# Patient Record
Sex: MALE | Race: OTHER | URBAN - METROPOLITAN AREA
[De-identification: names, ages, dates, MRNs, and addresses within clinical notes are randomized per-mention and may not be internally consistent; named-entity substitution may affect disease eponyms.]

---

## 2020-07-12 ENCOUNTER — INPATIENT (INPATIENT)
Facility: HOSPITAL | Age: 65
LOS: 3 days | Discharge: HOME | DRG: 454 | End: 2020-07-16
Attending: ORTHOPAEDIC SURGERY | Admitting: ORTHOPAEDIC SURGERY
Payer: COMMERCIAL

## 2020-07-12 VITALS
DIASTOLIC BLOOD PRESSURE: 93 MMHG | OXYGEN SATURATION: 97 % | RESPIRATION RATE: 18 BRPM | HEIGHT: 68 IN | WEIGHT: 225.09 LBS | HEART RATE: 102 BPM | SYSTOLIC BLOOD PRESSURE: 157 MMHG | TEMPERATURE: 99 F

## 2020-07-12 DIAGNOSIS — E11.9 TYPE 2 DIABETES MELLITUS WITHOUT COMPLICATIONS: ICD-10-CM

## 2020-07-12 DIAGNOSIS — E11.40 TYPE 2 DIABETES MELLITUS WITH DIABETIC NEUROPATHY, UNSPECIFIED: ICD-10-CM

## 2020-07-12 DIAGNOSIS — H33.21 SEROUS RETINAL DETACHMENT, RIGHT EYE: ICD-10-CM

## 2020-07-12 DIAGNOSIS — E78.5 HYPERLIPIDEMIA, UNSPECIFIED: ICD-10-CM

## 2020-07-12 DIAGNOSIS — Z01.818 ENCOUNTER FOR OTHER PREPROCEDURAL EXAMINATION: ICD-10-CM

## 2020-07-12 DIAGNOSIS — N17.9 ACUTE KIDNEY FAILURE, UNSPECIFIED: ICD-10-CM

## 2020-07-12 DIAGNOSIS — E11.319 TYPE 2 DIABETES MELLITUS WITH UNSPECIFIED DIABETIC RETINOPATHY WITHOUT MACULAR EDEMA: ICD-10-CM

## 2020-07-12 DIAGNOSIS — M71.38 OTHER BURSAL CYST, OTHER SITE: ICD-10-CM

## 2020-07-12 DIAGNOSIS — M53.2X6 SPINAL INSTABILITIES, LUMBAR REGION: ICD-10-CM

## 2020-07-12 DIAGNOSIS — I10 ESSENTIAL (PRIMARY) HYPERTENSION: ICD-10-CM

## 2020-07-12 DIAGNOSIS — Z96.651 PRESENCE OF RIGHT ARTIFICIAL KNEE JOINT: ICD-10-CM

## 2020-07-12 DIAGNOSIS — M48.00 SPINAL STENOSIS, SITE UNSPECIFIED: ICD-10-CM

## 2020-07-12 LAB
ALBUMIN SERPL ELPH-MCNC: 4.3 G/DL — SIGNIFICANT CHANGE UP (ref 3.3–5)
ALP SERPL-CCNC: 75 U/L — SIGNIFICANT CHANGE UP (ref 40–120)
ALT FLD-CCNC: 21 U/L — SIGNIFICANT CHANGE UP (ref 10–45)
ANION GAP SERPL CALC-SCNC: 14 MMOL/L — SIGNIFICANT CHANGE UP (ref 5–17)
APPEARANCE UR: CLEAR — SIGNIFICANT CHANGE UP
APTT BLD: 32.4 SEC — SIGNIFICANT CHANGE UP (ref 27.5–35.5)
AST SERPL-CCNC: 21 U/L — SIGNIFICANT CHANGE UP (ref 10–40)
BASOPHILS # BLD AUTO: 0.04 K/UL — SIGNIFICANT CHANGE UP (ref 0–0.2)
BASOPHILS NFR BLD AUTO: 0.5 % — SIGNIFICANT CHANGE UP (ref 0–2)
BILIRUB SERPL-MCNC: 0.4 MG/DL — SIGNIFICANT CHANGE UP (ref 0.2–1.2)
BILIRUB UR-MCNC: NEGATIVE — SIGNIFICANT CHANGE UP
BLD GP AB SCN SERPL QL: NEGATIVE — SIGNIFICANT CHANGE UP
BLD GP AB SCN SERPL QL: NEGATIVE — SIGNIFICANT CHANGE UP
BUN SERPL-MCNC: 19 MG/DL — SIGNIFICANT CHANGE UP (ref 7–23)
CALCIUM SERPL-MCNC: 9.9 MG/DL — SIGNIFICANT CHANGE UP (ref 8.4–10.5)
CHLORIDE SERPL-SCNC: 99 MMOL/L — SIGNIFICANT CHANGE UP (ref 96–108)
CO2 SERPL-SCNC: 30 MMOL/L — SIGNIFICANT CHANGE UP (ref 22–31)
COLOR SPEC: YELLOW — SIGNIFICANT CHANGE UP
CREAT SERPL-MCNC: 1.4 MG/DL — HIGH (ref 0.5–1.3)
DIFF PNL FLD: NEGATIVE — SIGNIFICANT CHANGE UP
EOSINOPHIL # BLD AUTO: 0.12 K/UL — SIGNIFICANT CHANGE UP (ref 0–0.5)
EOSINOPHIL NFR BLD AUTO: 1.6 % — SIGNIFICANT CHANGE UP (ref 0–6)
GLUCOSE BLDC GLUCOMTR-MCNC: 224 MG/DL — HIGH (ref 70–99)
GLUCOSE SERPL-MCNC: 317 MG/DL — HIGH (ref 70–99)
GLUCOSE UR QL: >=1000
HCT VFR BLD CALC: 45.3 % — SIGNIFICANT CHANGE UP (ref 39–50)
HGB BLD-MCNC: 15.5 G/DL — SIGNIFICANT CHANGE UP (ref 13–17)
IMM GRANULOCYTES NFR BLD AUTO: 0.5 % — SIGNIFICANT CHANGE UP (ref 0–1.5)
INR BLD: 0.99 — SIGNIFICANT CHANGE UP (ref 0.88–1.16)
KETONES UR-MCNC: NEGATIVE — SIGNIFICANT CHANGE UP
LEUKOCYTE ESTERASE UR-ACNC: NEGATIVE — SIGNIFICANT CHANGE UP
LYMPHOCYTES # BLD AUTO: 1.67 K/UL — SIGNIFICANT CHANGE UP (ref 1–3.3)
LYMPHOCYTES # BLD AUTO: 22.8 % — SIGNIFICANT CHANGE UP (ref 13–44)
MCHC RBC-ENTMCNC: 30.7 PG — SIGNIFICANT CHANGE UP (ref 27–34)
MCHC RBC-ENTMCNC: 34.2 GM/DL — SIGNIFICANT CHANGE UP (ref 32–36)
MCV RBC AUTO: 89.7 FL — SIGNIFICANT CHANGE UP (ref 80–100)
MONOCYTES # BLD AUTO: 0.6 K/UL — SIGNIFICANT CHANGE UP (ref 0–0.9)
MONOCYTES NFR BLD AUTO: 8.2 % — SIGNIFICANT CHANGE UP (ref 2–14)
NEUTROPHILS # BLD AUTO: 4.85 K/UL — SIGNIFICANT CHANGE UP (ref 1.8–7.4)
NEUTROPHILS NFR BLD AUTO: 66.4 % — SIGNIFICANT CHANGE UP (ref 43–77)
NITRITE UR-MCNC: NEGATIVE — SIGNIFICANT CHANGE UP
NRBC # BLD: 0 /100 WBCS — SIGNIFICANT CHANGE UP (ref 0–0)
PH UR: 6 — SIGNIFICANT CHANGE UP (ref 5–8)
PLATELET # BLD AUTO: 208 K/UL — SIGNIFICANT CHANGE UP (ref 150–400)
POTASSIUM SERPL-MCNC: 4.7 MMOL/L — SIGNIFICANT CHANGE UP (ref 3.5–5.3)
POTASSIUM SERPL-SCNC: 4.7 MMOL/L — SIGNIFICANT CHANGE UP (ref 3.5–5.3)
PROT SERPL-MCNC: 7.3 G/DL — SIGNIFICANT CHANGE UP (ref 6–8.3)
PROT UR-MCNC: NEGATIVE MG/DL — SIGNIFICANT CHANGE UP
PROTHROM AB SERPL-ACNC: 11.9 SEC — SIGNIFICANT CHANGE UP (ref 10.6–13.6)
RBC # BLD: 5.05 M/UL — SIGNIFICANT CHANGE UP (ref 4.2–5.8)
RBC # FLD: 12.9 % — SIGNIFICANT CHANGE UP (ref 10.3–14.5)
RH IG SCN BLD-IMP: NEGATIVE — SIGNIFICANT CHANGE UP
RH IG SCN BLD-IMP: NEGATIVE — SIGNIFICANT CHANGE UP
SARS-COV-2 RNA SPEC QL NAA+PROBE: SIGNIFICANT CHANGE UP
SODIUM SERPL-SCNC: 143 MMOL/L — SIGNIFICANT CHANGE UP (ref 135–145)
SP GR SPEC: 1.02 — SIGNIFICANT CHANGE UP (ref 1–1.03)
UROBILINOGEN FLD QL: 0.2 E.U./DL — SIGNIFICANT CHANGE UP
WBC # BLD: 7.32 K/UL — SIGNIFICANT CHANGE UP (ref 3.8–10.5)
WBC # FLD AUTO: 7.32 K/UL — SIGNIFICANT CHANGE UP (ref 3.8–10.5)

## 2020-07-12 PROCEDURE — 93010 ELECTROCARDIOGRAM REPORT: CPT | Mod: NC

## 2020-07-12 PROCEDURE — 71046 X-RAY EXAM CHEST 2 VIEWS: CPT | Mod: 26

## 2020-07-12 PROCEDURE — 99254 IP/OBS CNSLTJ NEW/EST MOD 60: CPT | Mod: GC

## 2020-07-12 PROCEDURE — 99285 EMERGENCY DEPT VISIT HI MDM: CPT

## 2020-07-12 RX ORDER — DEXTROSE 50 % IN WATER 50 %
25 SYRINGE (ML) INTRAVENOUS ONCE
Refills: 0 | Status: DISCONTINUED | OUTPATIENT
Start: 2020-07-12 | End: 2020-07-14

## 2020-07-12 RX ORDER — DEXTROSE 50 % IN WATER 50 %
12.5 SYRINGE (ML) INTRAVENOUS ONCE
Refills: 0 | Status: DISCONTINUED | OUTPATIENT
Start: 2020-07-12 | End: 2020-07-14

## 2020-07-12 RX ORDER — POVIDONE-IODINE 5 %
1 AEROSOL (ML) TOPICAL ONCE
Refills: 0 | Status: COMPLETED | OUTPATIENT
Start: 2020-07-12 | End: 2020-07-13

## 2020-07-12 RX ORDER — INSULIN LISPRO 100/ML
VIAL (ML) SUBCUTANEOUS
Refills: 0 | Status: DISCONTINUED | OUTPATIENT
Start: 2020-07-12 | End: 2020-07-14

## 2020-07-12 RX ORDER — ATORVASTATIN CALCIUM 80 MG/1
10 TABLET, FILM COATED ORAL AT BEDTIME
Refills: 0 | Status: DISCONTINUED | OUTPATIENT
Start: 2020-07-12 | End: 2020-07-16

## 2020-07-12 RX ORDER — ACETAMINOPHEN 500 MG
975 TABLET ORAL EVERY 8 HOURS
Refills: 0 | Status: DISCONTINUED | OUTPATIENT
Start: 2020-07-12 | End: 2020-07-16

## 2020-07-12 RX ORDER — HYDROMORPHONE HYDROCHLORIDE 2 MG/ML
0.5 INJECTION INTRAMUSCULAR; INTRAVENOUS; SUBCUTANEOUS EVERY 4 HOURS
Refills: 0 | Status: DISCONTINUED | OUTPATIENT
Start: 2020-07-12 | End: 2020-07-13

## 2020-07-12 RX ORDER — LISINOPRIL 2.5 MG/1
40 TABLET ORAL DAILY
Refills: 0 | Status: DISCONTINUED | OUTPATIENT
Start: 2020-07-12 | End: 2020-07-13

## 2020-07-12 RX ORDER — MAGNESIUM HYDROXIDE 400 MG/1
30 TABLET, CHEWABLE ORAL DAILY
Refills: 0 | Status: DISCONTINUED | OUTPATIENT
Start: 2020-07-12 | End: 2020-07-16

## 2020-07-12 RX ORDER — OXYCODONE HYDROCHLORIDE 5 MG/1
10 TABLET ORAL EVERY 4 HOURS
Refills: 0 | Status: DISCONTINUED | OUTPATIENT
Start: 2020-07-12 | End: 2020-07-13

## 2020-07-12 RX ORDER — GLUCAGON INJECTION, SOLUTION 0.5 MG/.1ML
1 INJECTION, SOLUTION SUBCUTANEOUS ONCE
Refills: 0 | Status: DISCONTINUED | OUTPATIENT
Start: 2020-07-12 | End: 2020-07-16

## 2020-07-12 RX ORDER — CHLORHEXIDINE GLUCONATE 213 G/1000ML
1 SOLUTION TOPICAL EVERY 12 HOURS
Refills: 0 | Status: COMPLETED | OUTPATIENT
Start: 2020-07-12 | End: 2020-07-13

## 2020-07-12 RX ORDER — LANOLIN ALCOHOL/MO/W.PET/CERES
5 CREAM (GRAM) TOPICAL AT BEDTIME
Refills: 0 | Status: DISCONTINUED | OUTPATIENT
Start: 2020-07-12 | End: 2020-07-16

## 2020-07-12 RX ORDER — SODIUM CHLORIDE 9 MG/ML
1000 INJECTION, SOLUTION INTRAVENOUS
Refills: 0 | Status: DISCONTINUED | OUTPATIENT
Start: 2020-07-12 | End: 2020-07-16

## 2020-07-12 RX ORDER — OXYCODONE HYDROCHLORIDE 5 MG/1
5 TABLET ORAL EVERY 4 HOURS
Refills: 0 | Status: DISCONTINUED | OUTPATIENT
Start: 2020-07-12 | End: 2020-07-13

## 2020-07-12 RX ORDER — POLYETHYLENE GLYCOL 3350 17 G/17G
17 POWDER, FOR SOLUTION ORAL DAILY
Refills: 0 | Status: DISCONTINUED | OUTPATIENT
Start: 2020-07-12 | End: 2020-07-16

## 2020-07-12 RX ORDER — DEXTROSE 50 % IN WATER 50 %
15 SYRINGE (ML) INTRAVENOUS ONCE
Refills: 0 | Status: DISCONTINUED | OUTPATIENT
Start: 2020-07-12 | End: 2020-07-14

## 2020-07-12 RX ORDER — SODIUM CHLORIDE 9 MG/ML
1000 INJECTION, SOLUTION INTRAVENOUS
Refills: 0 | Status: DISCONTINUED | OUTPATIENT
Start: 2020-07-12 | End: 2020-07-15

## 2020-07-12 RX ORDER — ONDANSETRON 8 MG/1
4 TABLET, FILM COATED ORAL EVERY 6 HOURS
Refills: 0 | Status: DISCONTINUED | OUTPATIENT
Start: 2020-07-12 | End: 2020-07-16

## 2020-07-12 RX ADMIN — Medication 975 MILLIGRAM(S): at 21:54

## 2020-07-12 RX ADMIN — Medication 5 MILLIGRAM(S): at 21:54

## 2020-07-12 RX ADMIN — Medication 2: at 23:49

## 2020-07-12 RX ADMIN — LISINOPRIL 40 MILLIGRAM(S): 2.5 TABLET ORAL at 21:54

## 2020-07-12 RX ADMIN — ATORVASTATIN CALCIUM 10 MILLIGRAM(S): 80 TABLET, FILM COATED ORAL at 21:54

## 2020-07-12 NOTE — ED ADULT NURSE NOTE - OBJECTIVE STATEMENT
Patient presents to the ED complaining of back pain related to spinal stenosis. Patient reports pain and weakness down the L left and states that he is dragging his foot. Patient was sent to the ED by MD Schmitz. Denies any urinary incontinence.

## 2020-07-12 NOTE — H&P ADULT - PROBLEM SELECTOR PLAN 1
Admit  Preop labs  Med clearance   Pain control   Restart home meds  Diet and NPO past MN  Hydration  DVT ppx  Dispo: OR

## 2020-07-12 NOTE — CONSULT NOTE ADULT - ASSESSMENT
65M PMH DM, HTN, HLD, retainal detachemnt s/p surgery in 2014 presents with left foot drop and left lower extremity weakness found to have spinal stenosis pending L5-S1 TLIF, under orthopedics, med consult called for preoperative optimization.

## 2020-07-12 NOTE — ED ADULT TRIAGE NOTE - CHIEF COMPLAINT QUOTE
"I have been having low back pain for a long time from spinal stenosis and I just cant take it anymore" denies incontinence of bowel/ bladder, denies numbness tingling. ambulatory to ED

## 2020-07-12 NOTE — CONSULT NOTE ADULT - PROBLEM SELECTOR RECOMMENDATION 7
history of hyperlipidemia   continue with home Lipitor 00mg daily history of hyperlipidemia   continue with home Lipitor 10mg daily history of right retinal detachment, surgery in 2014

## 2020-07-12 NOTE — CONSULT NOTE ADULT - ATTENDING COMMENTS
patient seen and examined overnight   reviewed pertinent data, consult note   PE as above except motor and reflexes deferred, findings per PGY2; pt w/ negative SLR on attending exam ; no paraspinal tenderness on palpation     1. preop: medically optimized; hold ACEi preop, monitor cr post op; given hydralazine PO o/n for elevated BP; restart acei post op if Cr at baseline, otherwise start alternative agent (such as norvasc)   rest of plan as above

## 2020-07-12 NOTE — ED PROVIDER NOTE - CLINICAL SUMMARY MEDICAL DECISION MAKING FREE TEXT BOX
64 y/o M sent in by Dr. Schmitz for known spinal stenosis with new foot drop, patient undergoing conservative care x 1 year but symptoms worsening. Strength and sensations intact however subjective weakness noted. Foot starting to drag. Will admit, plan for urgent surgery to prevent further damage.

## 2020-07-12 NOTE — ED PROVIDER NOTE - PHYSICAL EXAMINATION
CONSTITUTIONAL: Well-appearing; well-nourished; in no apparent distress.   HEAD: Normocephalic; atraumatic.   EYES:  conjunctiva and sclera clear  ENT: normal nose; no rhinorrhea;  NECK: Supple; full ROM  BACK: No spinal tenderness, no stepoffs.   RESPIRATORY: Breathing easily; no resp difficulty  EXT: No cyanosis or edema;  SKIN: Normal for age and race; warm; dry; good turgor; no apparent lesions or rash.   NEURO: L sided LE subjective weakness, sensations intact. A & O x 3; face symmetric.  PSYCHOLOGICAL: The patient’s mood and manner are appropriate.

## 2020-07-12 NOTE — CONSULT NOTE ADULT - PROBLEM SELECTOR RECOMMENDATION 6
history of hypertension  - continue with home lisinopril 10mg daily history of hypertension, hypertensive on admission (sBPs 160-170) likely secondary to pain.  - continue with home lisinopril 10mg daily, hold on day of surgery history of hypertension, hypertensive on admission (sBPs 160-170) likely secondary to pain.  - continue with home lisinopril 10mg daily, hold on day of surgery since it can cause causes intraoperative hypotension. history of hypertension, hypertensive on admission (sBPs 160-170) likely secondary to pain.  - continue with home lisinopril 10mg daily, hold on day of surgery since it can cause intraoperative hypotension. history of hypertension, hypertensive on admission (sBPs 160-170) likely secondary to pain.  - continue with home lisinopril 10mg daily, hold on day of surgery since it can cause intraoperative hypotension.  - consider at Norvasc 10mg if still hypertensive despite pain control history of hypertension, hypertensive on admission (sBPs 160-170) likely secondary to pain.  - continue with home lisinopril 10mg daily, hold on day of surgery since it can cause intraoperative hypotension.  - consider at Norvasc 10mg if still hypertensive despite pain control  - give Hydralazine 10mg PO x1 history of hypertension, hypertensive on admission (sBPs 160-170) likely secondary to pain.  - on home lisinopril 40mg daily, hold on day of surgery since it can cause intraoperative hypotension.  - consider at Norvasc 10mg if still hypertensive despite pain control and after seizure given elevated creatinine  - give Hydralazine 10mg PO x1 history of hyperlipidemia   continue with home Lipitor 10mg daily

## 2020-07-12 NOTE — H&P ADULT - NSHPPHYSICALEXAM_GEN_ALL_CORE
Gen: Awake and alert, NAD  Back - no step offs, symmetric alignment  Sensation intact BL LE  Motor strength LLE: EHL/FHL 3/5 and RLE EHL/FHL 5/5  2+ DP pulse   Toes WWP  No clonus  No hyperreflexia

## 2020-07-12 NOTE — CONSULT NOTE ADULT - PROBLEM SELECTOR RECOMMENDATION 4
history of right retinal detachment, surgery in 2014 history of hypertension, hypertensive on admission (sBPs 160-170) likely secondary to pain.  - on home lisinopril 40mg daily, hold on day of surgery since it can cause intraoperative hypotension.  - consider at Norvasc 10mg if still hypertensive despite pain control and after procedure given elevated creatinine  - give Hydralazine 10mg PO x1    ADDENDUM: monitor Cr; if at baseline, restart ACEi postop, otherwise start norvasc as alternative bp med post op

## 2020-07-12 NOTE — H&P ADULT - HISTORY OF PRESENT ILLNESS
65M hx 65M hx HTN, DM and chronic back pain p/w recent worsening of lumbar radiculopathy. Reports failed conservative therapy including steroid injections and opts for surgical intervention. Reports new LLE foot drop with associated pain and weakness. Denies numbness or tingling.

## 2020-07-12 NOTE — CONSULT NOTE ADULT - PROBLEM SELECTOR RECOMMENDATION 3
presents with creatine 1.4, unknown baseline  - f/u urine lytes  - monitor creatinine presents with creatine 1.4, unknown baseline.   - f/u urine lytes  - monitor creatinine

## 2020-07-12 NOTE — CONSULT NOTE ADULT - PROBLEM SELECTOR RECOMMENDATION 9
preop clearance for L5-S1 TLIF for spinal stenosis  RCRI of 0, Class 1, 3.9% 30 day risk of death, MI or cardiac arrest  Kim score 0.2% risk of MI or cardiac arrest, intraoperatively or up to 30 days post-op    Patient is an low cardiovascular risk for a intermediate risk procedure (L5-S1 TLIF)    Patient presenting with left lower extremity foor drop and weakness found to have spinal stenosis pending L5-S1 TLIF. Patient is able to ambulate independently prior and can walk up to 2 miles. He has no history of ischemic heart disease, CHF, CVA.  - EKG normal sinus rhythm, no significant ischemic findings.   - CXR with no evidence of infiltrates or consolidation  - Operative management as per orthopedics  - Patient would benefit from PT/OT post op  - May consider social work consult given patient lives alone and has had multiple falls. preop clearance for L5-S1 TLIF for spinal stenosis  RCRI of 0, Class 1, 3.9% 30 day risk of death, MI or cardiac arrest  Kim score 0.2% risk of MI or cardiac arrest, intraoperatively or up to 30 days post-op    Patient is an low cardiovascular risk for a intermediate risk procedure (L5-S1 TLIF)    Patient presenting with left lower extremity foor drop and weakness found to have spinal stenosis pending L5-S1 TLIF. Patient is able to ambulate independently prior and can walk up to 2 miles. He has no history of ischemic heart disease, CHF, CVA.  - EKG normal sinus rhythm, no significant ischemic findings.   - CXR with no evidence of infiltrates or consolidation  - Operative management as per orthopedics  - Patient would benefit from PT/OT post op

## 2020-07-12 NOTE — ED PROVIDER NOTE - CARE PLAN
Principal Discharge DX:	Spinal stenosis, unspecified spinal region  Secondary Diagnosis:	Foot drop, left

## 2020-07-12 NOTE — CONSULT NOTE ADULT - SUBJECTIVE AND OBJECTIVE BOX
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 Schmitz to the ED for preop admission/testing due to spinal stenosis with worsening pain and now with foot drop/dragging, subjective weakness in feet and difficult ambulating. He says that the pain is radiating from his lower back to his left leg with tinglinig sensations but denies numbness. He says he has full strength and sensation in his lower extremities but endorses weakness and his left foot "giving out" when he walks. Patient says that he has been complaining of lower back pain and has failed management with a chiropractor and physical therapy. He started seeing Dr. Schmitz for about a year, where he was given injections which only showed mild improvement for shortened periods of time. He is currently taking gabapentin and Tylenol for pain with minimal improvement. Otherwise, patient denies any fevers, chills, cough, chest pain, shortness of breath, abdominal pain, n/v/d/c, edema.      PAST MEDICAL HISTORY: HTN, DM, HLD R eye blindness s/p retinal detachment surgery PAST SURGICAL HISTORY:  retinal detachment surgery in 2014  MEDICATIONS: Lisinopril 40mg daily, Metformin 1000mg daily, Lipitor 10mg daily  FAMILY HISTORY: No significant family history  ALLERGIES: NKDA  SOCIAL HISTORY: former smoker, 4-5 cigarettes a day for 7 years, quit 40 years ago, denies alcohol or drug use.      T(C): 36.6 (07-12-20 @ 20:34), Max: 37.1 (07-12-20 @ 15:46)  HR: 71 (07-12-20 @ 20:34) (71 - 102)  BP: 181/101 (07-12-20 @ 20:34) (157/93 - 181/101)  RR: 17 (07-12-20 @ 20:34) (16 - 18)  SpO2: 96% (07-12-20 @ 20:34) (96% - 97%)  Wt(kg): --Vital Signs Last 24 Hrs  T(C): 36.6 (12 Jul 2020 20:34), Max: 37.1 (12 Jul 2020 15:46)  T(F): 97.9 (12 Jul 2020 20:34), Max: 98.8 (12 Jul 2020 15:46)  HR: 71 (12 Jul 2020 20:34) (71 - 102)  BP: 181/101 (12 Jul 2020 20:34) (157/93 - 181/101)  BP(mean): --  RR: 17 (12 Jul 2020 20:34) (16 - 18)  SpO2: 96% (12 Jul 2020 20:34) (96% - 97%)    PHYSICAL EXAM:  GENERAL: NAD, well-groomed, well-developed  HEAD:  Atraumatic, Normocephalic  EYES: EOMI, PERRLA, conjunctiva and sclera clear  ENMT: No tonsillar erythema, exudates, or enlargement; Moist mucous membranes, Good dentition, No lesions  NECK: Supple, No JVD, Normal thyroid  NERVOUS SYSTEM:  Alert & Oriented X3, Good concentration; Motor Strength 5/5 B/L upper and lower extremities; DTRs 2+ intact and symmetric  CHEST/LUNG: Clear to percussion bilaterally; No rales, rhonchi, wheezing, or rubs  HEART: Regular rate and rhythm; No murmurs, rubs, or gallops  ABDOMEN: Soft, Nontender, Nondistended; Bowel sounds present  EXTREMITIES:  2+ Peripheral Pulses, No clubbing, cyanosis, or edema  LYMPH: No lymphadenopathy noted  SKIN: No rashes or lesions    Consultant(s) Notes Reviewed:  [x ] YES  [ ] NO  Care Discussed with Consultants/Other Providers [ x] YES  [ ] NO    LABS:  CBC   07-12-20 @ 16:38  Hematcorit 45.3  Hemoglobin 15.5  Mean Cell Hemoglobin 30.7  Platelet Count-Automated 208  RBC Count 5.05  Red Cell Distrib Width 12.9  Wbc Count 7.32      BMP  07-12-20 @ 16:38  Anion Gap. Serum 14  Blood Urea Nitrogen,Serm 19  Calcium, Total Serum 9.9  Carbon Dioxide, Serum 30  Chloride, Serum 99  Creatinine, Serum 1.40  eGFR in  62  eGFR in Non Afican American 53  Gloucose, serum 317  Potassium, Serum 4.7  Sodium, Serum 143                  CMP  07-12-20 @ 16:38  Stella Aminotransferase(ALT/SGPT)21  Albumin, Serum 4.3  Alkaline Phosphatase, Serum 75  Anion Gap, Serum 14  Aspartate Aminotransferase (AST/SGOT)21  Bilirubin Total, Serum 0.4  Blood Urea Nitrogen, Serum 19  Calcium,Total Serum 9.9  Carbon Dioxide, Serum 30  Chloride, Serum 99  Creatinine, Serum 1.40  eGFR if  62  eGFR if Non African American 53  Glucose, Serum 317  Potassium, Serum 4.7  Protein Total, Serum 7.3  Sodium, Serum 143                          PT/INR  PT/INR  07-12-20 @ 16:38  INR 0.99  Prothrombin Time Comment --  Prothrobin Time, [id number]      Amylase/Lipase            RADIOLOGY & ADDITIONAL TESTS:    Imaging Personally Reviewed:  [ ] YES  [ ] NO YVES DODSON  65y  Male      65M PMH of HTN, DM, HLD R eye blindness s/p retinal detachment surgery in 2014, partial R knee replacement 2016, sent in by Dr. Schmitz to the ED for preop admission/testing due to spinal stenosis with worsening pain and now with foot drop/dragging, subjective weakness in feet and difficult ambulating. He says that the pain is radiating from his lower back to his left leg with tinglinig sensations but denies numbness. He says he has full strength and sensation in his lower extremities but endorses weakness and his left foot "giving out" when he walks. Patient says that he has been complaining of lower back pain and has failed management with a chiropractor and physical therapy. He started seeing Dr. Schmitz for about a year, where he was given injections which only showed mild improvement for shortened periods of time. He is currently taking gabapentin and Tylenol for pain with minimal improvement. Otherwise, patient denies any fevers, chills, cough, chest pain, shortness of breath, abdominal pain, n/v/d/c, edema.      PAST MEDICAL HISTORY: HTN, DM, HLD R eye blindness s/p retinal detachment surgery   PAST SURGICAL HISTORY: retinal detachment surgery in 2014, partial left knee replacement in 2016  MEDICATIONS: Lisinopril 40mg daily, Metformin 1000mg daily, Lipitor 10mg daily  FAMILY HISTORY: No significant family history  ALLERGIES: NKDA  SOCIAL HISTORY: former smoker, 4-5 cigarettes a day for 7 years, quit 40 years ago, denies alcohol or drug use.      T(C): 36.6 (07-12-20 @ 20:34), Max: 37.1 (07-12-20 @ 15:46)  HR: 71 (07-12-20 @ 20:34) (71 - 102)  BP: 181/101 (07-12-20 @ 20:34) (157/93 - 181/101)  RR: 17 (07-12-20 @ 20:34) (16 - 18)  SpO2: 96% (07-12-20 @ 20:34) (96% - 97%)  Wt(kg): --Vital Signs Last 24 Hrs  T(C): 36.6 (12 Jul 2020 20:34), Max: 37.1 (12 Jul 2020 15:46)  T(F): 97.9 (12 Jul 2020 20:34), Max: 98.8 (12 Jul 2020 15:46)  HR: 71 (12 Jul 2020 20:34) (71 - 102)  BP: 181/101 (12 Jul 2020 20:34) (157/93 - 181/101)  BP(mean): --  RR: 17 (12 Jul 2020 20:34) (16 - 18)  SpO2: 96% (12 Jul 2020 20:34) (96% - 97%)    PHYSICAL EXAM:  GENERAL: NAD, well-groomed, well-developed  HEAD:  Atraumatic, Normocephalic  EYES: EOMI, PERRLA, conjunctiva and sclera clear  ENMT: No tonsillar erythema, exudates, or enlargement; Moist mucous membranes, Good dentition, No lesions  NECK: Supple, No JVD, Normal thyroid  NERVOUS SYSTEM:  Alert & Oriented X3, Good concentration; Motor Strength 5/5 B/L upper and lower extremities; DTRs 2+ intact and symmetric  CHEST/LUNG: Clear to percussion bilaterally; No rales, rhonchi, wheezing, or rubs  HEART: Regular rate and rhythm; No murmurs, rubs, or gallops  ABDOMEN: Soft, Nontender, Nondistended; Bowel sounds present  EXTREMITIES:  Positive straight leg raise and diminished reflexes are noted, 2+ Peripheral Pulses, No clubbing, cyanosis, or edema  LYMPH: No lymphadenopathy noted  SKIN: No rashes or lesions    Consultant(s) Notes Reviewed:  [x ] YES  [ ] NO  Care Discussed with Consultants/Other Providers [ x] YES  [ ] NO    LABS:  CBC   07-12-20 @ 16:38  Hematcorit 45.3  Hemoglobin 15.5  Mean Cell Hemoglobin 30.7  Platelet Count-Automated 208  RBC Count 5.05  Red Cell Distrib Width 12.9  Wbc Count 7.32      BMP  07-12-20 @ 16:38  Anion Gap. Serum 14  Blood Urea Nitrogen,Serm 19  Calcium, Total Serum 9.9  Carbon Dioxide, Serum 30  Chloride, Serum 99  Creatinine, Serum 1.40  eGFR in  62  eGFR in Non Afican American 53  Gloucose, serum 317  Potassium, Serum 4.7  Sodium, Serum 143                  CMP  07-12-20 @ 16:38  Stella Aminotransferase(ALT/SGPT)21  Albumin, Serum 4.3  Alkaline Phosphatase, Serum 75  Anion Gap, Serum 14  Aspartate Aminotransferase (AST/SGOT)21  Bilirubin Total, Serum 0.4  Blood Urea Nitrogen, Serum 19  Calcium,Total Serum 9.9  Carbon Dioxide, Serum 30  Chloride, Serum 99  Creatinine, Serum 1.40  eGFR if  62  eGFR if Non African American 53  Glucose, Serum 317  Potassium, Serum 4.7  Protein Total, Serum 7.3  Sodium, Serum 143                          PT/INR  PT/INR  07-12-20 @ 16:38  INR 0.99  Prothrombin Time Comment --  Prothrobin Time, [id number]      Amylase/Lipase            RADIOLOGY & ADDITIONAL TESTS:    Imaging Personally Reviewed:  [ ] YES  [ ] NO

## 2020-07-12 NOTE — PROGRESS NOTE ADULT - SUBJECTIVE AND OBJECTIVE BOX
Patient seen and examined following his presentation to the emergency room.  The patient describes severe unremitting pain radiating down the left lower extremity, difficulty with gait and weakness in the extremity.  Exam notable for lower extremity weakness with 3/5dorsiflexion/extensor hallucis longus/knee flexion.  Positive straight leg raise and diminished reflexes are noted.    The condition and treatment options were discussed with the patient.    The risks, benefits and alternatives to surgery were discussed.   The primary goal of surgery is to relieve radicular pain and maximize neurological recovery, while also stabilizing the spondylolisthesis.  He understands that no guarantee can be made for full neurological recovery. The complications of surgery were discussed and include, but are not limited to, wound problems, infection, bleeding, vascular injury, nerve injury, paralysis, dural defect/cerebrospinal fluid leak, instability, need for further surgery of any kind including but not limited to revision discectomy and/or fusion/instrumention, loss of fixation, hardware failure, junctional/adjacent level disease, persistent radicular symptoms and/or pain and/or weakness, deep vein thrombosis, pulmonary embolus, myocardial infarction, stroke and death.  All questions were answered, the patient acknowledges understanding all of the above and informed consent was obtained.        ***Notes/documentation by others:  Despite the "electronic signature" I have provided in this electronic note and/or chart, the contents of this note (and notes by others in this chart) have not been reviewed for accuracy.  Hospital policy requires me to provide an electronic signature but it is not my usual and customary practice to review the notes of others.  As a result, I cannot attest to the accuracy or contents of notes/documentation by others.  JUANA

## 2020-07-13 ENCOUNTER — RESULT REVIEW (OUTPATIENT)
Age: 65
End: 2020-07-13

## 2020-07-13 DIAGNOSIS — R79.89 OTHER SPECIFIED ABNORMAL FINDINGS OF BLOOD CHEMISTRY: ICD-10-CM

## 2020-07-13 DIAGNOSIS — M48.061 SPINAL STENOSIS, LUMBAR REGION WITHOUT NEUROGENIC CLAUDICATION: ICD-10-CM

## 2020-07-13 LAB
A1C WITH ESTIMATED AVERAGE GLUCOSE RESULT: 11.6 % — HIGH (ref 4–5.6)
ANION GAP SERPL CALC-SCNC: 11 MMOL/L — SIGNIFICANT CHANGE UP (ref 5–17)
BUN SERPL-MCNC: 17 MG/DL — SIGNIFICANT CHANGE UP (ref 7–23)
CALCIUM SERPL-MCNC: 9.2 MG/DL — SIGNIFICANT CHANGE UP (ref 8.4–10.5)
CHLORIDE SERPL-SCNC: 102 MMOL/L — SIGNIFICANT CHANGE UP (ref 96–108)
CO2 SERPL-SCNC: 28 MMOL/L — SIGNIFICANT CHANGE UP (ref 22–31)
CREAT SERPL-MCNC: 1.14 MG/DL — SIGNIFICANT CHANGE UP (ref 0.5–1.3)
ESTIMATED AVERAGE GLUCOSE: 286 MG/DL — HIGH (ref 68–114)
GLUCOSE BLDC GLUCOMTR-MCNC: 192 MG/DL — HIGH (ref 70–99)
GLUCOSE BLDC GLUCOMTR-MCNC: 195 MG/DL — HIGH (ref 70–99)
GLUCOSE BLDC GLUCOMTR-MCNC: 197 MG/DL — HIGH (ref 70–99)
GLUCOSE BLDC GLUCOMTR-MCNC: 220 MG/DL — HIGH (ref 70–99)
GLUCOSE BLDC GLUCOMTR-MCNC: 225 MG/DL — HIGH (ref 70–99)
GLUCOSE BLDC GLUCOMTR-MCNC: 281 MG/DL — HIGH (ref 70–99)
GLUCOSE SERPL-MCNC: 229 MG/DL — HIGH (ref 70–99)
HCT VFR BLD CALC: 43.7 % — SIGNIFICANT CHANGE UP (ref 39–50)
HCV AB S/CO SERPL IA: 0.07 S/CO — SIGNIFICANT CHANGE UP
HCV AB SERPL-IMP: SIGNIFICANT CHANGE UP
HGB BLD-MCNC: 14.7 G/DL — SIGNIFICANT CHANGE UP (ref 13–17)
MCHC RBC-ENTMCNC: 30.2 PG — SIGNIFICANT CHANGE UP (ref 27–34)
MCHC RBC-ENTMCNC: 33.6 GM/DL — SIGNIFICANT CHANGE UP (ref 32–36)
MCV RBC AUTO: 89.7 FL — SIGNIFICANT CHANGE UP (ref 80–100)
NRBC # BLD: 0 /100 WBCS — SIGNIFICANT CHANGE UP (ref 0–0)
PLATELET # BLD AUTO: 186 K/UL — SIGNIFICANT CHANGE UP (ref 150–400)
POTASSIUM SERPL-MCNC: 4.1 MMOL/L — SIGNIFICANT CHANGE UP (ref 3.5–5.3)
POTASSIUM SERPL-SCNC: 4.1 MMOL/L — SIGNIFICANT CHANGE UP (ref 3.5–5.3)
RBC # BLD: 4.87 M/UL — SIGNIFICANT CHANGE UP (ref 4.2–5.8)
RBC # FLD: 12.8 % — SIGNIFICANT CHANGE UP (ref 10.3–14.5)
SODIUM SERPL-SCNC: 141 MMOL/L — SIGNIFICANT CHANGE UP (ref 135–145)
WBC # BLD: 6.02 K/UL — SIGNIFICANT CHANGE UP (ref 3.8–10.5)
WBC # FLD AUTO: 6.02 K/UL — SIGNIFICANT CHANGE UP (ref 3.8–10.5)

## 2020-07-13 PROCEDURE — 88304 TISSUE EXAM BY PATHOLOGIST: CPT | Mod: 26

## 2020-07-13 RX ORDER — SENNA PLUS 8.6 MG/1
2 TABLET ORAL AT BEDTIME
Refills: 0 | Status: DISCONTINUED | OUTPATIENT
Start: 2020-07-13 | End: 2020-07-16

## 2020-07-13 RX ORDER — HYDRALAZINE HCL 50 MG
10 TABLET ORAL ONCE
Refills: 0 | Status: COMPLETED | OUTPATIENT
Start: 2020-07-13 | End: 2020-07-13

## 2020-07-13 RX ORDER — HYDROMORPHONE HYDROCHLORIDE 2 MG/ML
30 INJECTION INTRAMUSCULAR; INTRAVENOUS; SUBCUTANEOUS
Refills: 0 | Status: DISCONTINUED | OUTPATIENT
Start: 2020-07-13 | End: 2020-07-15

## 2020-07-13 RX ORDER — HYDROMORPHONE HYDROCHLORIDE 2 MG/ML
0.5 INJECTION INTRAMUSCULAR; INTRAVENOUS; SUBCUTANEOUS
Refills: 0 | Status: DISCONTINUED | OUTPATIENT
Start: 2020-07-13 | End: 2020-07-15

## 2020-07-13 RX ORDER — CEFAZOLIN SODIUM 1 G
2000 VIAL (EA) INJECTION EVERY 8 HOURS
Refills: 0 | Status: COMPLETED | OUTPATIENT
Start: 2020-07-13 | End: 2020-07-14

## 2020-07-13 RX ORDER — HYDROMORPHONE HYDROCHLORIDE 2 MG/ML
0.5 INJECTION INTRAMUSCULAR; INTRAVENOUS; SUBCUTANEOUS ONCE
Refills: 0 | Status: DISCONTINUED | OUTPATIENT
Start: 2020-07-13 | End: 2020-07-13

## 2020-07-13 RX ORDER — NALOXONE HYDROCHLORIDE 4 MG/.1ML
0.1 SPRAY NASAL
Refills: 0 | Status: DISCONTINUED | OUTPATIENT
Start: 2020-07-13 | End: 2020-07-16

## 2020-07-13 RX ADMIN — Medication 1: at 14:39

## 2020-07-13 RX ADMIN — SENNA PLUS 2 TABLET(S): 8.6 TABLET ORAL at 22:09

## 2020-07-13 RX ADMIN — Medication 10 MILLIGRAM(S): at 05:54

## 2020-07-13 RX ADMIN — SODIUM CHLORIDE 120 MILLILITER(S): 9 INJECTION, SOLUTION INTRAVENOUS at 14:40

## 2020-07-13 RX ADMIN — ATORVASTATIN CALCIUM 10 MILLIGRAM(S): 80 TABLET, FILM COATED ORAL at 22:09

## 2020-07-13 RX ADMIN — Medication 100 MILLIGRAM(S): at 17:06

## 2020-07-13 RX ADMIN — HYDROMORPHONE HYDROCHLORIDE 30 MILLILITER(S): 2 INJECTION INTRAMUSCULAR; INTRAVENOUS; SUBCUTANEOUS at 15:31

## 2020-07-13 RX ADMIN — Medication 2: at 06:26

## 2020-07-13 RX ADMIN — HYDROMORPHONE HYDROCHLORIDE 0.5 MILLIGRAM(S): 2 INJECTION INTRAMUSCULAR; INTRAVENOUS; SUBCUTANEOUS at 16:10

## 2020-07-13 RX ADMIN — Medication 5 MILLIGRAM(S): at 22:09

## 2020-07-13 RX ADMIN — Medication 1 APPLICATION(S): at 07:24

## 2020-07-13 RX ADMIN — Medication 3: at 22:11

## 2020-07-13 RX ADMIN — Medication 2: at 17:13

## 2020-07-13 RX ADMIN — CHLORHEXIDINE GLUCONATE 1 APPLICATION(S): 213 SOLUTION TOPICAL at 06:25

## 2020-07-13 RX ADMIN — HYDROMORPHONE HYDROCHLORIDE 0.5 MILLIGRAM(S): 2 INJECTION INTRAMUSCULAR; INTRAVENOUS; SUBCUTANEOUS at 14:47

## 2020-07-13 NOTE — PROGRESS NOTE ADULT - SUBJECTIVE AND OBJECTIVE BOX
The patient was seen, re-examined.  He now has symptoms involving the right lower extremity and feels continued weakness in the left lower extremity.  He continues to describe severe pain and cannot ambulate at this time without assistance.  He is very hesitant to take narcotic pain medications due to the diabetes.  Examination shows continued 3/5 weakness in left Extensor hallucis longus, dorsiflexion and eversion (new) and knee flexion.  Right extensor hallucis longus and dorsiflexion now noted to be 4/5.    The risks, benefits and alternatives to L4-5  laminectomy/fusion with pedicle screws and interbody spacers were again discussed.  The primary goal of surgery is to relieve lower extremity radicular pain, which has a success rate averaging 85% while stabilizing the spondylolisthesis.  He has bilateral hip arthritis and more likely then not may need to have this addressed at some point in the future. The complications of surgery were discussed and include, but are not limited to, wound problems, infection, bleeding, vascular injury, nerve injury, paralysis, csf leak, recurrent stenosis, instability, need for further surgery, junctional/adjacent level disease, nonunion, pseudarthrosis, need for dural repair, hardware failure, loss of fixation, persistent symptoms, deep vein thrombosis, pulmonary embolus, cardiac event, stroke and death.  All questions answered, informed consent was obtained.      ***Notes/documentation by others:  Despite the "electronic signature" I have provided in this electronic note and/or chart, the contents of this note and/or notes by others in this chart have not been reviewed for accuracy.  Hospital policy requires me to provide an electronic signature but it is not my usual and customary practice to review the notes of others.  As a result, I cannot attest to the accuracy or contents of notes and/or documentation by others.  NLT

## 2020-07-13 NOTE — PROGRESS NOTE ADULT - SUBJECTIVE AND OBJECTIVE BOX
POST OPERATIVE DAY #: 0  STATUS POST: s/p    TLIF L5-S1         SUBJECTIVE: Patient seen and examined. States to having pain in the back. Patient denies any CP, SOB, fever, chills, numbness/tingling, leg symptoms.     Pain:  well controlled      OBJECTIVE:     Vital Signs Last 24 Hrs  T(C): 36.9 (2020 05:17), Max: 37.1 (2020 15:46)  T(F): 98.4 (2020 05:17), Max: 98.8 (2020 15:46)  HR: 60 (2020 05:17) (60 - 102)  BP: 175/88 (2020 07:15) (157/93 - 188/95)  BP(mean): --  RR: 17 (2020 05:17) (16 - 18)  SpO2: 98% (2020 05:17) (95% - 98%)    Affected extremity:          Dressing: clean/dry/intact          HV x 1         Sensation: intact to light touch          Motor exam: EHL/TA/GS 5/5 to b/L le          warm, well-perfused; capillary refill < 3 seconds              I&O's Detail      LABS:                        14.7   6.02  )-----------( 186      ( 2020 06:25 )             43.7     07-13    141  |  102  |  17  ----------------------------<  229<H>  4.1   |  28  |  1.14    Ca    9.2      2020 06:25    TPro  7.3  /  Alb  4.3  /  TBili  0.4  /  DBili  x   /  AST  21  /  ALT  21  /  AlkPhos  75  07-12    PT/INR - ( 2020 16:38 )   PT: 11.9 sec;   INR: 0.99          PTT - ( 2020 16:38 )  PTT:32.4 sec  Urinalysis Basic - ( 2020 17:56 )    Color: Yellow / Appearance: Clear / S.020 / pH: x  Gluc: x / Ketone: NEGATIVE  / Bili: Negative / Urobili: 0.2 E.U./dL   Blood: x / Protein: NEGATIVE mg/dL / Nitrite: NEGATIVE   Leuk Esterase: NEGATIVE / RBC: x / WBC x   Sq Epi: x / Non Sq Epi: x / Bacteria: x        MEDICATIONS:    acetaminophen   Tablet .. 975 milliGRAM(s) Oral every 8 hours PRN  HYDROmorphone PCA (1 mG/mL) 30 milliLiter(s) PCA Continuous PCA Continuous  HYDROmorphone PCA (5 mG/mL) Rescue Clinician Bolus 0.5 milliGRAM(s) IV Push every 2 hours PRN  melatonin 5 milliGRAM(s) Oral at bedtime  ondansetron Injectable 4 milliGRAM(s) IV Push every 6 hours PRN  oxyCODONE    IR 5 milliGRAM(s) Oral every 4 hours PRN  oxyCODONE    IR 10 milliGRAM(s) Oral every 4 hours PRN        RADIOLOGY & ADDITIONAL STUDIES:    ASSESSMENT AND PLAN: TLIF L5-S1    1. Analgesic pain control  2. DVT prophylaxis:       SCDs      Other:   4. Weight Bearing Status:  Weight bearing as tolerated       5. Disposition: Pending PT eval

## 2020-07-13 NOTE — PRE-OP CHECKLIST - SELECT TESTS ORDERED
PT/PTT/CBC/Urinalysis/EKG/INR/CMP/Type and Cross/Type and Screen/CXR/BMP CBC/PT/PTT/Type and Screen/Urinalysis/192/POCT Blood Glucose/INR/EKG/Type and Cross/BMP/CMP/CXR

## 2020-07-13 NOTE — PROGRESS NOTE ADULT - SUBJECTIVE AND OBJECTIVE BOX
addendum:  The elevated glucose and A1C are noted and the risks, benefits and alternatives to surgery in light of this discussed with the patient.  These lab values increase the risk of, including but not limited to infection, wound healing and neurological recovery.  Nevertheless, in light of his neurological deterioration and inability to ambulate, he would like to proceed.

## 2020-07-14 ENCOUNTER — TRANSCRIPTION ENCOUNTER (OUTPATIENT)
Age: 65
End: 2020-07-14

## 2020-07-14 LAB
ANION GAP SERPL CALC-SCNC: 12 MMOL/L — SIGNIFICANT CHANGE UP (ref 5–17)
BASOPHILS # BLD AUTO: 0.01 K/UL — SIGNIFICANT CHANGE UP (ref 0–0.2)
BASOPHILS NFR BLD AUTO: 0.1 % — SIGNIFICANT CHANGE UP (ref 0–2)
BUN SERPL-MCNC: 22 MG/DL — SIGNIFICANT CHANGE UP (ref 7–23)
CALCIUM SERPL-MCNC: 8.5 MG/DL — SIGNIFICANT CHANGE UP (ref 8.4–10.5)
CHLORIDE SERPL-SCNC: 98 MMOL/L — SIGNIFICANT CHANGE UP (ref 96–108)
CO2 SERPL-SCNC: 27 MMOL/L — SIGNIFICANT CHANGE UP (ref 22–31)
CREAT SERPL-MCNC: 1.37 MG/DL — HIGH (ref 0.5–1.3)
CULTURE RESULTS: NO GROWTH — SIGNIFICANT CHANGE UP
EOSINOPHIL # BLD AUTO: 0.01 K/UL — SIGNIFICANT CHANGE UP (ref 0–0.5)
EOSINOPHIL NFR BLD AUTO: 0.1 % — SIGNIFICANT CHANGE UP (ref 0–6)
GLUCOSE BLDC GLUCOMTR-MCNC: 214 MG/DL — HIGH (ref 70–99)
GLUCOSE BLDC GLUCOMTR-MCNC: 228 MG/DL — HIGH (ref 70–99)
GLUCOSE BLDC GLUCOMTR-MCNC: 236 MG/DL — HIGH (ref 70–99)
GLUCOSE BLDC GLUCOMTR-MCNC: 252 MG/DL — HIGH (ref 70–99)
GLUCOSE SERPL-MCNC: 271 MG/DL — HIGH (ref 70–99)
HCT VFR BLD CALC: 37.2 % — LOW (ref 39–50)
HGB BLD-MCNC: 12.6 G/DL — LOW (ref 13–17)
IMM GRANULOCYTES NFR BLD AUTO: 0.7 % — SIGNIFICANT CHANGE UP (ref 0–1.5)
LYMPHOCYTES # BLD AUTO: 0.92 K/UL — LOW (ref 1–3.3)
LYMPHOCYTES # BLD AUTO: 10 % — LOW (ref 13–44)
MCHC RBC-ENTMCNC: 30.7 PG — SIGNIFICANT CHANGE UP (ref 27–34)
MCHC RBC-ENTMCNC: 33.9 GM/DL — SIGNIFICANT CHANGE UP (ref 32–36)
MCV RBC AUTO: 90.5 FL — SIGNIFICANT CHANGE UP (ref 80–100)
MONOCYTES # BLD AUTO: 1.1 K/UL — HIGH (ref 0–0.9)
MONOCYTES NFR BLD AUTO: 12 % — SIGNIFICANT CHANGE UP (ref 2–14)
NEUTROPHILS # BLD AUTO: 7.1 K/UL — SIGNIFICANT CHANGE UP (ref 1.8–7.4)
NEUTROPHILS NFR BLD AUTO: 77.1 % — HIGH (ref 43–77)
NRBC # BLD: 0 /100 WBCS — SIGNIFICANT CHANGE UP (ref 0–0)
PLATELET # BLD AUTO: 173 K/UL — SIGNIFICANT CHANGE UP (ref 150–400)
POTASSIUM SERPL-MCNC: 4.5 MMOL/L — SIGNIFICANT CHANGE UP (ref 3.5–5.3)
POTASSIUM SERPL-SCNC: 4.5 MMOL/L — SIGNIFICANT CHANGE UP (ref 3.5–5.3)
RBC # BLD: 4.11 M/UL — LOW (ref 4.2–5.8)
RBC # FLD: 13.1 % — SIGNIFICANT CHANGE UP (ref 10.3–14.5)
SODIUM SERPL-SCNC: 137 MMOL/L — SIGNIFICANT CHANGE UP (ref 135–145)
SPECIMEN SOURCE: SIGNIFICANT CHANGE UP
WBC # BLD: 9.2 K/UL — SIGNIFICANT CHANGE UP (ref 3.8–10.5)
WBC # FLD AUTO: 9.2 K/UL — SIGNIFICANT CHANGE UP (ref 3.8–10.5)

## 2020-07-14 PROCEDURE — 99233 SBSQ HOSP IP/OBS HIGH 50: CPT

## 2020-07-14 PROCEDURE — 99222 1ST HOSP IP/OBS MODERATE 55: CPT | Mod: GC

## 2020-07-14 RX ORDER — GLUCAGON INJECTION, SOLUTION 0.5 MG/.1ML
1 INJECTION, SOLUTION SUBCUTANEOUS ONCE
Refills: 0 | Status: DISCONTINUED | OUTPATIENT
Start: 2020-07-14 | End: 2020-07-16

## 2020-07-14 RX ORDER — DEXTROSE 50 % IN WATER 50 %
25 SYRINGE (ML) INTRAVENOUS ONCE
Refills: 0 | Status: DISCONTINUED | OUTPATIENT
Start: 2020-07-14 | End: 2020-07-16

## 2020-07-14 RX ORDER — INSULIN GLARGINE 100 [IU]/ML
20 INJECTION, SOLUTION SUBCUTANEOUS AT BEDTIME
Refills: 0 | Status: DISCONTINUED | OUTPATIENT
Start: 2020-07-14 | End: 2020-07-15

## 2020-07-14 RX ORDER — INSULIN LISPRO 100/ML
3 VIAL (ML) SUBCUTANEOUS
Refills: 0 | Status: DISCONTINUED | OUTPATIENT
Start: 2020-07-14 | End: 2020-07-15

## 2020-07-14 RX ORDER — ACETAMINOPHEN 500 MG
975 TABLET ORAL EVERY 8 HOURS
Refills: 0 | Status: DISCONTINUED | OUTPATIENT
Start: 2020-07-14 | End: 2020-07-16

## 2020-07-14 RX ORDER — DEXTROSE 50 % IN WATER 50 %
12.5 SYRINGE (ML) INTRAVENOUS ONCE
Refills: 0 | Status: DISCONTINUED | OUTPATIENT
Start: 2020-07-14 | End: 2020-07-16

## 2020-07-14 RX ORDER — INSULIN LISPRO 100/ML
VIAL (ML) SUBCUTANEOUS
Refills: 0 | Status: DISCONTINUED | OUTPATIENT
Start: 2020-07-14 | End: 2020-07-16

## 2020-07-14 RX ORDER — CYCLOBENZAPRINE HYDROCHLORIDE 10 MG/1
5 TABLET, FILM COATED ORAL THREE TIMES A DAY
Refills: 0 | Status: DISCONTINUED | OUTPATIENT
Start: 2020-07-14 | End: 2020-07-16

## 2020-07-14 RX ORDER — SODIUM CHLORIDE 9 MG/ML
1000 INJECTION, SOLUTION INTRAVENOUS
Refills: 0 | Status: DISCONTINUED | OUTPATIENT
Start: 2020-07-14 | End: 2020-07-16

## 2020-07-14 RX ORDER — DEXTROSE 50 % IN WATER 50 %
15 SYRINGE (ML) INTRAVENOUS ONCE
Refills: 0 | Status: DISCONTINUED | OUTPATIENT
Start: 2020-07-14 | End: 2020-07-16

## 2020-07-14 RX ADMIN — INSULIN GLARGINE 20 UNIT(S): 100 INJECTION, SOLUTION SUBCUTANEOUS at 21:57

## 2020-07-14 RX ADMIN — Medication 975 MILLIGRAM(S): at 21:57

## 2020-07-14 RX ADMIN — Medication 3 UNIT(S): at 16:40

## 2020-07-14 RX ADMIN — Medication 100 MILLIGRAM(S): at 00:43

## 2020-07-14 RX ADMIN — Medication 4: at 16:39

## 2020-07-14 RX ADMIN — ONDANSETRON 4 MILLIGRAM(S): 8 TABLET, FILM COATED ORAL at 08:21

## 2020-07-14 RX ADMIN — Medication 5 MILLIGRAM(S): at 21:57

## 2020-07-14 RX ADMIN — Medication 2: at 12:12

## 2020-07-14 RX ADMIN — Medication 2: at 07:07

## 2020-07-14 RX ADMIN — SENNA PLUS 2 TABLET(S): 8.6 TABLET ORAL at 21:57

## 2020-07-14 RX ADMIN — ATORVASTATIN CALCIUM 10 MILLIGRAM(S): 80 TABLET, FILM COATED ORAL at 21:57

## 2020-07-14 RX ADMIN — POLYETHYLENE GLYCOL 3350 17 GRAM(S): 17 POWDER, FOR SOLUTION ORAL at 12:12

## 2020-07-14 RX ADMIN — Medication 975 MILLIGRAM(S): at 22:57

## 2020-07-14 NOTE — DISCHARGE NOTE PROVIDER - HOSPITAL COURSE
Admit to Orthopedics     OR for TLIF L5-S1     Periop Antibx    DVT ppx: SCDs     PT/WBAT/OOB     Drains monitored and removed when appropriately low     Endocrine consult for BS control     Pain mgt Admit to Orthopedics 7/13/20    OR for TLIF L5-S1  7/13/20    Periop Antibx    DVT ppx: SCDs     PT/WBAT/OOB     Drains monitored and removed when appropriately low     Endocrine consult for DM type II    Pain mgt

## 2020-07-14 NOTE — DISCHARGE NOTE PROVIDER - NSDCFUADDINST_GEN_ALL_CORE_FT
No strenuous activity (bending/twisting), heavy lifting, driving or returning to work until cleared by MD.  You may take showers. Keep the battery pack dry. You may disconnect the dressing from the battery pack prior to showers and keep away from water. To do this, hold the on/off button down until it turns off, close the clamp on the tubing, then disconnect the tubing from the battery pack. Do the reverse to turn it back on.  No soaking in bathtubs.  The dressing has a battery that usually dies in 7 days. Once this occurs, you may remove the dressing and dispose of it, then leave incision open to air. Keep incision clean and dry.      Try to have regular bowel movements, take stool softener or laxative if necessary.  May take pepcid or zantac for upset stomach.  Ice affected areas to decrease swelling.  Call to schedule an appt with Dr. Schmitz for follow up, if you have staples or sutures they will be removed in office.  Contact your doctor if you experience: fever greater than 101.5, chills, chest pain, difficulty breathing, redness or excessive drainage around the incision, other concerns. No strenuous activity (bending/twisting), heavy lifting, driving or returning to work until cleared by MD.  You may take showers. Keep the battery pack dry. You may disconnect the dressing from the battery pack prior to showers and keep away from water. To do this, hold the on/off button down until it turns off, close the clamp on the tubing, then disconnect the tubing from the battery pack. Do the reverse to turn it back on.  No soaking in bathtubs.  The dressing has a battery that usually dies in 7 days. Once this occurs, you may remove the dressing and dispose of it, then leave incision open to air. Keep incision clean and dry.      Try to have regular bowel movements, take stool softener or laxative if necessary.  May take pepcid or zantac for upset stomach.  Ice affected areas to decrease swelling.  Call to schedule an appt with Dr. Schmitz for follow up, if you have staples or sutures they will be removed in office.  Contact your doctor if you experience: fever greater than 101.5, chills, chest pain, difficulty breathing, redness or excessive drainage around the incision, other concerns.  [facility] by calling  to make an appointment. No strenuous activity (bending/twisting), heavy lifting, driving or returning to work until cleared by MD.  You may take showers. Keep the battery pack dry. You may disconnect the dressing from the battery pack prior to showers and keep away from water. To do this, hold the on/off button down until it turns off, close the clamp on the tubing, then disconnect the tubing from the battery pack. Do the reverse to turn it back on.  No soaking in bathtubs.  The dressing has a battery that usually dies in 7 days. Once this occurs, you may remove the dressing and dispose of it, then leave incision open to air. Keep incision clean and dry.      Try to have regular bowel movements, take stool softener or laxative if necessary.  May take pepcid or zantac for upset stomach.  Ice affected areas to decrease swelling.  Call to schedule an appt with Dr. Schmitz for follow up, if you have staples or sutures they will be removed in office.  Contact your doctor if you experience: fever greater than 101.5, chills, chest pain, difficulty breathing, redness or excessive drainage around the incision, other concerns.  Follow up with [facility] by calling  to make an appointment. (control DM type II) Wear brace when out of bed.  No strenuous activity (bending/twisting), heavy lifting, driving or returning to work until cleared by MD.  You may take showers. Keep the battery pack dry. You may disconnect the dressing from the battery pack prior to showers and keep away from water. To do this, hold the on/off button down until it turns off, close the clamp on the tubing, then disconnect the tubing from the battery pack. Do the reverse to turn it back on.  No soaking in bathtubs.  The dressing has a battery that usually dies in 7 days. Once this occurs, you may remove the dressing and dispose of it, then leave incision open to air. Keep incision clean and dry.      Try to have regular bowel movements, take stool softener or laxative if necessary.  May take pepcid or zantac for upset stomach.  Ice affected areas to decrease swelling.  Call to schedule an appt with Dr. Schmitz for follow up, if you have staples or sutures they will be removed in office.  Contact your doctor if you experience: fever greater than 101.5, chills, chest pain, difficulty breathing, redness or excessive drainage around the incision, other concerns.  Follow up with [facility] by calling  to make an appointment. (control DM type II)

## 2020-07-14 NOTE — PHYSICAL THERAPY INITIAL EVALUATION ADULT - GENERAL OBSERVATIONS, REHAB EVAL
Pt received semi supine, +lumbar incision bandage C/D/I, +hemovac, +PCA pump, +heplock, NAD, agreeable to PT.

## 2020-07-14 NOTE — PHYSICAL THERAPY INITIAL EVALUATION ADULT - PERTINENT HX OF CURRENT PROBLEM, REHAB EVAL
65M hx HTN, DM and chronic back pain p/w recent worsening of lumbar radiculopathy. Reports failed conservative therapy including steroid injections and opts for surgical intervention. Reports new LLE foot drop with associated pain and weakness. Denies numbness or tingling.

## 2020-07-14 NOTE — CONSULT NOTE ADULT - ASSESSMENT
HPI: 65M PMH of HTN, DM, HLD R eye blindness s/p retinal detachment surgery in , partial R knee replacement 2016, sent in by Dr. Schmitz to the ED for preop admission/testing due to spinal stenosis with worsening pain and now with foot drop/dragging, subjective weakness in feet and difficult ambulating. He says that the pain is radiating from his lower back to his left leg with tinglinig sensations but denies numbness. He says he has full strength and sensation in his lower extremities but endorses weakness and his left foot "giving out" when he walks. Patient says that he has been complaining of lower back pain and has failed management with a chiropractor and physical therapy. He started seeing Dr. Schmitz for about a year, where he was given injections which only showed mild improvement for shortened periods of time. He is currently taking gabapentin and Tylenol for pain with minimal improvement. Otherwise, patient denies any fevers, chills, cough, chest pain, shortness of breath, abdominal pain, n/v/d/c, edema. He is s/p TLIF of L5-S1 on 20.  Currently, POst-op day 1. On morphine PCA.    Endocrine was consulted for his DM management. He has been on sliding scale during the hospital course.     FSG & Insulin received:  Yesterday:  600 AM FSG - 225 , Lispro 2 uints  1400 FSG - 195, Lispro 1 unit  pre-dinner fs, 2  units lispro SS  bedtime fs, 3  units lispro SS  Today:  pre-breakfast fs, 2  units lispro SS  pre-lunch fs, 2  units lispro SS    DM History  Age at Dx:  How dx:  Hx and duration of insulin:  Current Therapy: Humalog 75/25 mix 10 units TID, Metformin 1000mg immediate release BID  Hx of hypoglycemia  Hx of DKA/HHS?    Home FSG:  Fasting  Lunch  Dinner  Bed    Hx of other regimens  Complications:  Outpatient Endo:    PMH & Surgical Hx:  SPINAL STENOSISUNSPECIFIED SPINAL REGIO  Diabetes  Hypertension  Spinal stenosis, unspecified spinal region  Spinal stenosis of lumbar region, unspecified whether neurogenic claudication present  Creatinine elevation  HLD (hyperlipidemia)  Hypertension  Diabetes  Retinal detachment, right  CHAMP (acute kidney injury)  Spinal stenosis  BACK PAIN  Foot drop, left      FH:  DM:  Thyroid:  Autoimmune:  Other:      PAST MEDICAL HISTORY: HTN, DM, HLD R eye blindness s/p retinal detachment surgery   PAST SURGICAL HISTORY: retinal detachment surgery in 2014, partial left knee replacement in 2016  MEDICATIONS: Lisinopril 40mg daily, Metformin 1000mg daily, Lipitor 10mg daily  FAMILY HISTORY: No significant family history  ALLERGIES: NKDA  SOCIAL HISTORY: former smoker, 4-5 cigarettes a day for 7 years, quit 40 years ago, denies alcohol or drug use.      SH:  Smoking  Etoh:  Recreational Drugs:  Social Life:    Current Meds:  acetaminophen   Tablet .. 975 milliGRAM(s) Oral every 8 hours PRN  aluminum hydroxide/magnesium hydroxide/simethicone Suspension 30 milliLiter(s) Oral four times a day PRN  atorvastatin 10 milliGRAM(s) Oral at bedtime  bisacodyl Suppository 10 milliGRAM(s) Rectal daily PRN  dextrose 40% Gel 15 Gram(s) Oral once PRN  dextrose 5%. 1000 milliLiter(s) IV Continuous <Continuous>  dextrose 50% Injectable 12.5 Gram(s) IV Push once  dextrose 50% Injectable 25 Gram(s) IV Push once  dextrose 50% Injectable 25 Gram(s) IV Push once  glucagon  Injectable 1 milliGRAM(s) IntraMuscular once PRN  HYDROmorphone PCA (1 mG/mL) 30 milliLiter(s) PCA Continuous PCA Continuous  HYDROmorphone PCA (5 mG/mL) Rescue Clinician Bolus 0.5 milliGRAM(s) IV Push every 2 hours PRN  insulin lispro (HumaLOG) corrective regimen sliding scale   SubCutaneous Before meals and at bedtime  lactated ringers. 1000 milliLiter(s) IV Continuous <Continuous>  magnesium hydroxide Suspension 30 milliLiter(s) Oral daily PRN  melatonin 5 milliGRAM(s) Oral at bedtime  naloxone Injectable 0.1 milliGRAM(s) IV Push every 3 minutes PRN  ondansetron Injectable 4 milliGRAM(s) IV Push every 6 hours PRN  polyethylene glycol 3350 17 Gram(s) Oral daily  senna 2 Tablet(s) Oral at bedtime      Allergies:  No Known Allergies      ROS:  Denies the following except as indicated.    General: weight loss/weight gain, decreased appetite, fatigue  Eyes: Blurry vision, double vision, visual changes  ENT: Throat pain, changes in voice,   CV: palpitations, SOB, CP, cough  GI: NVD, difficulty swallowing, abdominal pain  : polyuria, dysuria  Endo: abnormal menses, temperature intolerance, decreased libido  MSK: weakness, joint pain  Skin: rash, dryness, diaphoresis  Heme: Easy bruising,bleeding  Neuro: HA, dizziness, lightheadedness, numbness tingling  Psych: Anxiety, Depression    Vital Signs Last 24 Hrs  T(C): 37.5 (2020 08:14), Max: 37.5 (2020 16:41)  T(F): 99.5 (2020 08:14), Max: 99.5 (2020 16:41)  HR: 72 (2020 08:14) (64 - 89)  BP: 124/72 (2020 08:14) (102/66 - 166/85)  BP(mean): 75 (2020 16:32) (75 - 121)  RR: 15 (2020 08:14) (14 - 21)  SpO2: 97% (2020 08:14) (94% - 99%)  Height (cm): 172.72 ( @ 07:39)  Weight (kg): 102.1 ( @ 07:39)  BMI (kg/m2): 34.2 ( @ 07:39)      Constitutional: wn/wd in NAD.   HEENT: NCAT, MMM, OP clear, EOMI, , no proptosis or lid retraction  Neck: no thyromegaly or palpable thyroid nodules   Respiratory: lungs CTAB.  Cardiovascular: regular rhythm, normal S1 and S2, no audible murmurs, no peripheral edema  GI: soft, NT/ND, no masses/HSM appreciated.  Neurology: no tremors, DTR 2+  Skin: no visible rashes/lesions  Psychiatric: AAO x 3, normal affect/mood.  Ext: radial pulses intact, DP pulses intact, extremities warm, no cyanosis, clubbing or edema.       LABS:                        12.6   9.20  )-----------( 173      ( 2020 07:39 )             37.2         137  |  98  |  22  ----------------------------<  271<H>  4.5   |  27  |  1.37<H>    Ca    8.5      2020 07:39    TPro  7.3  /  Alb  4.3  /  TBili  0.4  /  DBili  x   /  AST  21  /  ALT  21  /  AlkPhos  75  07-12    PT/INR - ( 2020 16:38 )   PT: 11.9 sec;   INR: 0.99          PTT - ( 2020 16:38 )  PTT:32.4 sec  Urinalysis Basic - ( 2020 17:56 )    Color: Yellow / Appearance: Clear / S.020 / pH: x  Gluc: x / Ketone: NEGATIVE  / Bili: Negative / Urobili: 0.2 E.U./dL   Blood: x / Protein: NEGATIVE mg/dL / Nitrite: NEGATIVE   Leuk Esterase: NEGATIVE / RBC: x / WBC x   Sq Epi: x / Non Sq Epi: x / Bacteria: x            RADIOLOGY & ADDITIONAL STUDIES:  CAPILLARY BLOOD GLUCOSE      POCT Blood Glucose.: 228 mg/dL (2020 11:45)  POCT Blood Glucose.: 236 mg/dL (2020 06:55)  POCT Blood Glucose.: 281 mg/dL (2020 21:54)  POCT Blood Glucose.: 220 mg/dL (2020 17:07)  POCT Blood Glucose.: 195 mg/dL (2020 14:20)        A/P:65y Male    1.  DM Type 2 - uncontrolled - with complications - stress induced hyperglycemia  - Hba1c 11.6  Cr/GFR 1.37/ 54  .1 kg with BMI 34.2    Please continue lantus       units at night / morning.  Please continue lispro      units before each meal.  Please continue lispro moderate / low dose sliding scale four times daily with meals and at bedtime    Pt's fingerstick glucose goal is     Will continue to monitor     For discharge, pt can continue    Pt can follow up at discharge with [facility] by calling  to make an appointment.   Will discuss case with     and update primary team HPI: 65M PMH of HTN, DM, HLD R eye blindness s/p retinal detachment surgery in , partial R knee replacement 2016, sent in by Dr. Schmitz to the ED for preop admission/testing due to spinal stenosis with worsening pain and now with foot drop/dragging, subjective weakness in feet and difficult ambulating. He says that the pain is radiating from his lower back to his left leg with tinglinig sensations but denies numbness. He says he has full strength and sensation in his lower extremities but endorses weakness and his left foot "giving out" when he walks. Patient says that he has been complaining of lower back pain and has failed management with a chiropractor and physical therapy. He started seeing Dr. Schmitz for about a year, where he was given injections which only showed mild improvement for shortened periods of time. He is currently taking gabapentin and Tylenol for pain with minimal improvement. Otherwise, patient denies any fevers, chills, cough, chest pain, shortness of breath, abdominal pain, n/v/d/c, edema. He is s/p TLIF of L5-S1 on 20.  Currently, POst-op day 1. On morphine PCA.    Endocrine was consulted for his DM management. Denies any polyuria, polydipsia or weight loss. He said that he was receiving steroid injections for his back - for the past 1 years - shots every 6 weeks - has received total of 5 shots. his last shot was 7 months ago.  He did not receive any steroids intra-op or post op  He has been on sliding scale during the hospital course. His appetite has not been that good after procedure.   FSG & Insulin received:  Yesterday:  600 AM FSG - 225 , Lispro 2 uints  1400 FSG - 195, Lispro 1 unit  pre-dinner fs, 2  units lispro SS  bedtime fs, 3  units lispro SS, had some saltine crackers  Today:  pre-breakfast fs, 2  units lispro SS, had little eggs, sausage which he threw up  pre-lunch fs, 2  units lispro SS, had broth and jello    DM History  Age at Dx: more than 10 years ago  How dx: regular blood work  Hx and duration of insulin: 2 years  Current Therapy: Humalog 75/25 mix 20 units QAM and 26 units QPM ( dose was recently increased a month ago from 14 units QAM and 20 units QPM since his HBa1c was 11 again), Metformin 1000mg ER BID  Hx of hypoglycemia: denies  Hx of DKA/HHS: denies    Home FSG:  He stopped checking his FSG for the past 4 months due to COVID situation  prior to that, he was checking his FSG 2 times a day  QAM FSG - will be 140s  QPM FSg will be 180s    He usually has 2 meals a day  BF - coffee  Lunch - fast foods like hamburger, sandwich with french fries  dinner - meat, 2 cups of rice and vegs  He was watching his diet before but due to COVID, he stopped his balanced diet and was eating lot of fast foods.  he also does snacking with 8 to 10 regular cookies and 1/2 pint ice cream every other day    Hx of other regimens: was on combiglyza - not now - not covered by insurance  Complications: retinopathy, neuropathy  Outpatient Endo: None.  - PCP -     PMH & Surgical Hx:  SPINAL STENOSISUNSPECIFIED SPINAL REGIO  Diabetes  Hypertension  Spinal stenosis, unspecified spinal region  Spinal stenosis of lumbar region, unspecified whether neurogenic claudication present  Creatinine elevation  HLD (hyperlipidemia)  Hypertension  Diabetes  Retinal detachment, right  CHAMP (acute kidney injury)  Spinal stenosis  BACK PAIN  Foot drop, left      FH:  DM: denies  Thyroid: denies  Autoimmune: denies    PAST SURGICAL HISTORY: retinal detachment surgery in 2014, partial left knee replacement in 2016  MEDICATIONS: Lisinopril 40mg daily, Metformin 1000mg daily, Lipitor 10mg daily  ALLERGIES: NKDA  SOCIAL HISTORY: former smoker, 4-5 cigarettes a day for 7 years, quit 40 years ago, denies alcohol or drug use. He works as a  at [employer] - works from 8 AM to 4PM    Current Meds:  acetaminophen   Tablet .. 975 milliGRAM(s) Oral every 8 hours PRN  aluminum hydroxide/magnesium hydroxide/simethicone Suspension 30 milliLiter(s) Oral four times a day PRN  atorvastatin 10 milliGRAM(s) Oral at bedtime  bisacodyl Suppository 10 milliGRAM(s) Rectal daily PRN  dextrose 40% Gel 15 Gram(s) Oral once PRN  dextrose 5%. 1000 milliLiter(s) IV Continuous <Continuous>  dextrose 50% Injectable 12.5 Gram(s) IV Push once  dextrose 50% Injectable 25 Gram(s) IV Push once  dextrose 50% Injectable 25 Gram(s) IV Push once  glucagon  Injectable 1 milliGRAM(s) IntraMuscular once PRN  HYDROmorphone PCA (1 mG/mL) 30 milliLiter(s) PCA Continuous PCA Continuous  HYDROmorphone PCA (5 mG/mL) Rescue Clinician Bolus 0.5 milliGRAM(s) IV Push every 2 hours PRN  insulin lispro (HumaLOG) corrective regimen sliding scale   SubCutaneous Before meals and at bedtime  lactated ringers. 1000 milliLiter(s) IV Continuous <Continuous>  magnesium hydroxide Suspension 30 milliLiter(s) Oral daily PRN  melatonin 5 milliGRAM(s) Oral at bedtime  naloxone Injectable 0.1 milliGRAM(s) IV Push every 3 minutes PRN  ondansetron Injectable 4 milliGRAM(s) IV Push every 6 hours PRN  polyethylene glycol 3350 17 Gram(s) Oral daily  senna 2 Tablet(s) Oral at bedtime      Allergies:  No Known Allergies      ROS:  Denies the following except as indicated.    General: decreased appetite, fatigue  Eyes: no vision changes in left eye  ENT: Throat pain, changes in voice,   CV: palpitations, SOB, CP, cough  GI: NVD, difficulty swallowing, abdominal pain  : polyuria, dysuria  Endo: temperature intolerance, decreased libido  MSK: weakness, joint pain  Skin: rash, dryness, diaphoresis  Heme: Easy bruising, bleeding  Neuro: HA, dizziness, lightheadedness  Psych: Anxiety, Depression    Vital Signs Last 24 Hrs  T(C): 37.5 (2020 08:14), Max: 37.5 (2020 16:41)  T(F): 99.5 (2020 08:14), Max: 99.5 (2020 16:41)  HR: 72 (2020 08:14) (64 - 89)  BP: 124/72 (2020 08:14) (102/66 - 166/85)  BP(mean): 75 (2020 16:32) (75 - 121)  RR: 15 (2020 08:14) (14 - 21)  SpO2: 97% (2020 08:14) (94% - 99%)  Height (cm): 172.72 (07-13 @ 07:39)  Weight (kg): 102.1 ( @ 07:39)  BMI (kg/m2): 34.2 ( @ 07:39)      Constitutional: wn/wd in NAD.   HEENT: No proptosis or lid retraction, right eye blindness  Neck: no thyromegaly or palpable thyroid nodules   Respiratory: lungs CTAB.  Cardiovascular: regular rhythm, normal S1 and S2, no peripheral edema  GI: soft, NT/ND, no masses/HSM appreciated.  Neurology: no tremors, DTR 2+, moves extremities  Psychiatric: AAO x 3, normal affect/mood.  Ext: radial pulses intact, DP pulses intact, extremities warm      LABS:                        12.6   9.20  )-----------( 173      ( 2020 07:39 )             37.2     0714    137  |  98  |  22  ----------------------------<  271<H>  4.5   |  27  |  1.37<H>    Ca    8.5      2020 07:39    TPro  7.3  /  Alb  4.3  /  TBili  0.4  /  DBili  x   /  AST  21  /  ALT  21  /  AlkPhos  75  07-12    PT/INR - ( 2020 16:38 )   PT: 11.9 sec;   INR: 0.99          PTT - ( 2020 16:38 )  PTT:32.4 sec  Urinalysis Basic - ( 2020 17:56 )    Color: Yellow / Appearance: Clear / S.020 / pH: x  Gluc: x / Ketone: NEGATIVE  / Bili: Negative / Urobili: 0.2 E.U./dL   Blood: x / Protein: NEGATIVE mg/dL / Nitrite: NEGATIVE   Leuk Esterase: NEGATIVE / RBC: x / WBC x   Sq Epi: x / Non Sq Epi: x / Bacteria: x            RADIOLOGY & ADDITIONAL STUDIES:  CAPILLARY BLOOD GLUCOSE      POCT Blood Glucose.: 228 mg/dL (2020 11:45)  POCT Blood Glucose.: 236 mg/dL (2020 06:55)  POCT Blood Glucose.: 281 mg/dL (2020 21:54)  POCT Blood Glucose.: 220 mg/dL (2020 17:07)  POCT Blood Glucose.: 195 mg/dL (2020 14:20)        A/P: 65M PMH of HTN, DM, HLD R eye blindness s/p retinal detachment surgery in , partial R knee replacement 2016 was admitted for surgical treatment for spinal stenosis. He is S/P TLIF L5-S1on 2020    1.  DM Type 2 - uncontrolled - with complications - stress induced hyperglycemia  - Hba1c 11.6  Cr/GFR 1.37/ 54  .1 kg with BMI 34.2    carb consistent diet  Please continue lantus       units at night / morning.  Please continue lispro      units before each meal.  Please continue lispro moderate / low dose sliding scale four times daily with meals and at bedtime    Pt's fingerstick glucose goal is 120 to 150    Will continue to monitor     For discharge, TBD    Pt can follow up at discharge with [facility] by calling  to make an appointment.   discussed case with   and updated primary team

## 2020-07-14 NOTE — PROGRESS NOTE ADULT - SUBJECTIVE AND OBJECTIVE BOX
INTERVAL HPI/OVERNIGHT EVENTS: MINERVA O/N    SUBJECTIVE: Patient seen and examined at bedside.   Pt states he feels well and that pain in his back is improving. Looking forward to working w PT. Denies any fever, cough, chest pain, dyspnea. N/V/Abd pain. No numbness. Had some nausea w emesis w breakfast wo abd pain. No flatus. No urinary issues.    Regarding DM.   Checks BG twice daily in Am and after dinner - states usually in 170-180s but when asked if ever in 200 and 300s he states 'sometimes.' states he has been on metformin 1000 daily and BID 75/25 mix  (recently increased due to a1C 11 from ). States he has been on insulin for approx 1 year now. Endorses worsening a1C due to worsening diet during pandemic. Denies hypoglycemia. States prior a1C was 'near 7.'       OBJECTIVE:    VITAL SIGNS:  ICU Vital Signs Last 24 Hrs  T(C): 37.5 (2020 08:14), Max: 37.5 (2020 16:41)  T(F): 99.5 (2020 08:14), Max: 99.5 (2020 16:41)  HR: 72 (2020 08:14) (64 - 89)  BP: 124/72 (2020 08:14) (102/66 - 166/85)  BP(mean): 75 (2020 16:32) (75 - 121)  ABP: 108/63 (2020 17:28) (108/63 - 208/90)  ABP(mean): 100 (2020 14:57) (96 - 124)  RR: 15 (2020 08:14) (14 - 21)  SpO2: 97% (2020 08:14) (94% - 99%)        13 @ 07:  -  14 @ 07:00  --------------------------------------------------------  IN: 240 mL / OUT: 880 mL / NET: -640 mL     @ 07:  -   @ 12:27  --------------------------------------------------------  IN: 0 mL / OUT: 300 mL / NET: -300 mL      CAPILLARY BLOOD GLUCOSE      POCT Blood Glucose.: 228 mg/dL (2020 11:45)      PHYSICAL EXAM:  GEN: Male in NAD  HEENT: NC/AT, MMM  CV: RRR, nml S1S2 no murmurs, no BLE edema  PULM: Nml effort, CTAB  ABD: Soft, NABS, non-tender  NEURO: Alert, moving all extremities. 5/5 in BLE and BUE w sensation intact  PSYCH: Appropriate  SKIN: Dressing in back intact; 2 accordian drains w dark drainage      MEDICATIONS:  MEDICATIONS  (STANDING):  atorvastatin 10 milliGRAM(s) Oral at bedtime  dextrose 5%. 1000 milliLiter(s) (50 mL/Hr) IV Continuous <Continuous>  dextrose 50% Injectable 12.5 Gram(s) IV Push once  dextrose 50% Injectable 25 Gram(s) IV Push once  dextrose 50% Injectable 25 Gram(s) IV Push once  HYDROmorphone PCA (1 mG/mL) 30 milliLiter(s) PCA Continuous PCA Continuous  insulin lispro (HumaLOG) corrective regimen sliding scale   SubCutaneous Before meals and at bedtime  lactated ringers. 1000 milliLiter(s) (120 mL/Hr) IV Continuous <Continuous>  melatonin 5 milliGRAM(s) Oral at bedtime  polyethylene glycol 3350 17 Gram(s) Oral daily  senna 2 Tablet(s) Oral at bedtime    MEDICATIONS  (PRN):  acetaminophen   Tablet .. 975 milliGRAM(s) Oral every 8 hours PRN Temp greater or equal to 38C (100.4F), Mild Pain (1 - 3)  aluminum hydroxide/magnesium hydroxide/simethicone Suspension 30 milliLiter(s) Oral four times a day PRN Indigestion  bisacodyl Suppository 10 milliGRAM(s) Rectal daily PRN If no bowel movement by postoperative day #2  dextrose 40% Gel 15 Gram(s) Oral once PRN Blood Glucose LESS THAN 70 milliGRAM(s)/deciliter  glucagon  Injectable 1 milliGRAM(s) IntraMuscular once PRN Glucose LESS THAN 70 milligrams/deciliter  HYDROmorphone PCA (5 mG/mL) Rescue Clinician Bolus 0.5 milliGRAM(s) IV Push every 2 hours PRN breaktrough pain  magnesium hydroxide Suspension 30 milliLiter(s) Oral daily PRN Constipation  naloxone Injectable 0.1 milliGRAM(s) IV Push every 3 minutes PRN For ANY of the following changes in patient status:  A. RR LESS THAN 10 breaths per minute, B. Oxygen saturation LESS THAN 90%, C. Sedation score of 6  ondansetron Injectable 4 milliGRAM(s) IV Push every 6 hours PRN Nausea and/or Vomiting      ALLERGIES:  Allergies    No Known Allergies    Intolerances        LABS:                        12.6   9.20  )-----------( 173      ( 2020 07:39 )             37.2     07-14    137  |  98  |  22  ----------------------------<  271<H>  4.5   |  27  |  1.37<H>    Ca    8.5      2020 07:39    TPro  7.3  /  Alb  4.3  /  TBili  0.4  /  DBili  x   /  AST  21  /  ALT  21  /  AlkPhos  75  07-12    PT/INR - ( 2020 16:38 )   PT: 11.9 sec;   INR: 0.99          PTT - ( 2020 16:38 )  PTT:32.4 sec  Urinalysis Basic - ( 2020 17:56 )    Color: Yellow / Appearance: Clear / S.020 / pH: x  Gluc: x / Ketone: NEGATIVE  / Bili: Negative / Urobili: 0.2 E.U./dL   Blood: x / Protein: NEGATIVE mg/dL / Nitrite: NEGATIVE   Leuk Esterase: NEGATIVE / RBC: x / WBC x   Sq Epi: x / Non Sq Epi: x / Bacteria: x        RADIOLOGY & ADDITIONAL TESTS: Reviewed.

## 2020-07-14 NOTE — DISCHARGE NOTE PROVIDER - NSDCFUSCHEDAPPT_GEN_ALL_CORE_FT
YVES DODSON ; 07/31/2020 ; [facility] [address]  YVES DODSON ; 07/31/2020 ; [facility] [address] YVES DODSON ; 07/31/2020 ; [facility] [address]  YVES DODSON ; 07/31/2020 ; [facility] [address] YVES DODSON ; 07/31/2020 ; [facility] [address]  YVES DODSON ; 07/31/2020 ; [facility] [address]

## 2020-07-14 NOTE — PHYSICAL THERAPY INITIAL EVALUATION ADULT - ADDITIONAL COMMENTS
Pt lives with his family (father in law?) in an elevator access apt with 2 ANISH. His brother in law will be coming to stay and assist him as needed following discharge. At baseline, ambulates independently with no DME.

## 2020-07-14 NOTE — PROGRESS NOTE ADULT - SUBJECTIVE AND OBJECTIVE BOX
Did well overnight and managed surgical pain well with PCA; now very comfortable. Has been out of bed to bathroom with nursing staff.  Feels lower extremities are "better already."  tolerating diet; woodward in place; using incentive spirometry  PE: motor 5/5 except left extensor hallucis and dorsiflexion 4/5 (improved)  sensory intact  no calf tenderness  sophy and drain output noted  labs including glucose noted  imp;: doing well postop #1  endocrine consult for elevated glucose/diabetes recommendations  PT  pain management  incentive spirometry/scds  diabetic diet  follow drain output  check labs  upon discharge, patient reminded not to take nonsteroidal antinflammatories or have exposure to cigarrette/smoke  Also he acknowledges understanding to call for questions, fevers, chills or any wound drainage or to go to nearest emergency room for any acute issues.  Plan follow-up in 7-10 days following discharge.

## 2020-07-14 NOTE — DISCHARGE NOTE PROVIDER - CARE PROVIDER_API CALL
Hernan Schmitz  ORTHOPAEDIC SURGERY  [address] [place] [address]  Phone: [phone]  Fax: [phone]  Follow Up Time: 2 weeks

## 2020-07-14 NOTE — PROGRESS NOTE ADULT - SUBJECTIVE AND OBJECTIVE BOX
POST OPERATIVE DAY # 1 L5-S1 TLIF   SUBJECTIVE: Patient seen and examined.  Pt without complaints. Feels well. Notes he takes Kwik Pen 75/25 BID and Metformin 1000mg daily for his diabetes that is managed by PMD for last 10 years.   Denies chest pain/SOB/dizziness/n/v/HA   Pain well controlled.       OBJECTIVE:     Vital Signs Last 24 Hrs  T(C): 37.5 (2020 08:14), Max: 37.5 (2020 16:41)  T(F): 99.5 (2020 08:14), Max: 99.5 (2020 16:41)  HR: 72 (2020 08:14) (64 - 89)  BP: 124/72 (2020 08:14) (102/66 - 166/85)  BP(mean): 75 (2020 16:32) (75 - 121)  RR: 15 (2020 08:14) (14 - 21)  SpO2: 97% (2020 08:14) (94% - 99%)    PE:          Dressing: clean/dry/intact, Prevena and HV in place maintaining suction.          Sensation: intact to light touch to patient's baseline BLE         Motor exam:  firing ehl/ta/gs/fhl 5/5 BLE          Skin warm, well-perfused; capillary refill brisk             LABS:                        12.6   9.20  )-----------( 173      ( 2020 07:39 )             37.2     07-14    137  |  98  |  22  ----------------------------<  271<H>  4.5   |  27  |  1.37<H>    Ca    8.5      2020 07:39    TPro  7.3  /  Alb  4.3  /  TBili  0.4  /  DBili  x   /  AST  21  /  ALT  21  /  AlkPhos  75  07-12    PT/INR - ( 2020 16:38 )   PT: 11.9 sec;   INR: 0.99          PTT - ( 2020 16:38 )  PTT:32.4 sec  Urinalysis Basic - ( 2020 17:56 )  Color: Yellow / Appearance: Clear / S.020 / pH: x  Gluc: x / Ketone: NEGATIVE  / Bili: Negative / Urobili: 0.2 E.U./dL   Blood: x / Protein: NEGATIVE mg/dL / Nitrite: NEGATIVE   Leuk Esterase: NEGATIVE / RBC: x / WBC x   Sq Epi: x / Non Sq Epi: x / Bacteria: x          ASSESSMENT AND PLAN: POD 1 L5-S1 TLIF doing well, FS in 200-300 range  1. Stable. Labs reviewed.   2. Endo called for DM control given A1c is 11.6 and elevated fingersticks. Will follow up recs.   3. Drains remain in, will monitor output.   4. Tang out, trial of void now.  5. Analgesic pain control  6. DVT prophylaxis: SCDs  7. Weight Bearing Status: WBAT    8. Disposition: Pending PT

## 2020-07-14 NOTE — DISCHARGE NOTE PROVIDER - NSDCMRMEDTOKEN_GEN_ALL_CORE_FT
acetaminophen 325 mg oral tablet: 3 tab(s) orally every 8 hours, As needed, Temp greater or equal to 38C (100.4F), Mild Pain (1 - 3)  Do not exceed 4000mg/day  cyclobenzaprine 5 mg oral tablet: 1 tab(s) orally 3 times a day, As needed, Muscle Spasm  HumaLOG KwikPen 200 units/mL (Concentrated) subcutaneous solution: 12 unit(s) subcutaneous 3 times a day (before meals)   oxyCODONE 5 mg oral tablet: 1 to 2 tab(s) orally every 4 hours, As needed, Moderate to Severe Pain (4 - 6) MDD:6  polyethylene glycol 3350 oral powder for reconstitution: 17 gram(s) orally once a day  senna oral tablet: 2 tab(s) orally once a day (at bedtime)  Toujeo SoloStar 300 units/mL subcutaneous solution: 30 unit(s) subcutaneous once (at bedtime) acetaminophen 325 mg oral tablet: 3 tab(s) orally every 8 hours, As needed, Temp greater or equal to 38C (100.4F), Mild Pain (1 - 3)  Do not exceed 4000mg/day  atorvastatin 10 mg oral tablet: 1 tab(s) orally once a day (at bedtime)   cyclobenzaprine 5 mg oral tablet: 1 tab(s) orally 3 times a day, As needed, Muscle Spasm  HumaLOG KwikPen 200 units/mL (Concentrated) subcutaneous solution: 12 unit(s) subcutaneous 3 times a day (before meals)   lisinopril 20 mg oral tablet: 1 tab(s) orally once a day   oxyCODONE 5 mg oral tablet: 1 to 2 tab(s) orally every 4 hours, As needed, Moderate to Severe Pain (4 - 6) MDD:6  polyethylene glycol 3350 oral powder for reconstitution: 17 gram(s) orally once a day  senna oral tablet: 2 tab(s) orally once a day (at bedtime)  Toujeo SoloStar 300 units/mL subcutaneous solution: 30 unit(s) subcutaneous once (at bedtime)

## 2020-07-14 NOTE — CONSULT NOTE ADULT - SUBJECTIVE AND OBJECTIVE BOX
HPI: 65M PMH of HTN, DM, HLD R eye blindness s/p retinal detachment surgery in , partial R knee replacement 2016, sent in by Dr. Schmitz to the ED for preop admission/testing due to spinal stenosis with worsening pain and now with foot drop/dragging, subjective weakness in feet and difficult ambulating. He says that the pain is radiating from his lower back to his left leg with tinglinig sensations but denies numbness. He says he has full strength and sensation in his lower extremities but endorses weakness and his left foot "giving out" when he walks. Patient says that he has been complaining of lower back pain and has failed management with a chiropractor and physical therapy. He started seeing Dr. Schmitz for about a year, where he was given injections which only showed mild improvement for shortened periods of time. He is currently taking gabapentin and Tylenol for pain with minimal improvement. Otherwise, patient denies any fevers, chills, cough, chest pain, shortness of breath, abdominal pain, n/v/d/c, edema. He is s/p TLIF of L5-S1 on 20.  Currently, Post-op day 1. On hydromorphone PCA.    Endocrine was consulted for his DM management. Denies any polyuria, polydipsia or weight loss. He said that he was receiving steroid injections for his back - for the past 1 years - shots every 6 weeks - has received total of 5 shots. his last shot was 7 months ago.  He did not receive any steroids intra-op or post op  He has been on sliding scale during the hospital course. His appetite has not been that good after procedure. But he said that he is going to try to eat solid food for dinner  FSG & Insulin received:  Yesterday:  600 AM FSG - 225 , Lispro 2 uints  1400 FSG - 195, Lispro 1 unit  pre-dinner fs, 2  units lispro SS  bedtime fs, 3  units lispro SS, had some saltine crackers  Today:  pre-breakfast fs, 2  units lispro SS, had little eggs, sausage which he threw up  pre-lunch fs, 2  units lispro SS, had broth and jello    DM History  Age at Dx: more than 10 years ago  How dx: regular blood work  Hx and duration of insulin: 2 years  Current Therapy: Humalog 75/25 mix 20 units QAM and 26 units QPM ( dose was recently increased a month ago from 14 units QAM and 20 units QPM since his HBa1c was 11 again), Metformin 1000mg ER BID  Hx of hypoglycemia: denies  Hx of DKA/HHS: denies    Home FSG:  He stopped checking his FSG for the past 4 months due to COVID situation  prior to that, he was checking his FSG 2 times a day  QAM FSG - will be 140s  QPM FSg will be 180s    He usually has 2 meals a day  BF - coffee  Lunch - fast foods like hamburger, sandwich with french fries  dinner - meat, 2 cups of rice and vegs  He was watching his diet before but due to COVID, he stopped his balanced diet and was eating lot of fast foods.  he also does snacking with 8 to 10 regular cookies and 1/2 pint ice cream every other day    Hx of other regimens: was on combiglyza - not now - not covered by insurance  Complications: retinopathy, neuropathy  Outpatient Endo: None.  - PCP -     PMH & Surgical Hx:  SPINAL STENOSISUNSPECIFIED SPINAL REGIO  Diabetes  Hypertension  Spinal stenosis, unspecified spinal region  Spinal stenosis of lumbar region, unspecified whether neurogenic claudication present  Creatinine elevation  HLD (hyperlipidemia)  Hypertension  Diabetes  Retinal detachment, right  CHAMP (acute kidney injury)  Spinal stenosis  BACK PAIN  Foot drop, left      FH:  DM: denies  Thyroid: denies  Autoimmune: denies    PAST SURGICAL HISTORY: retinal detachment surgery in 2014, partial left knee replacement in 2016  MEDICATIONS: Lisinopril 40mg daily, Metformin 1000mg daily, Lipitor 10mg daily  ALLERGIES: NKDA  SOCIAL HISTORY: former smoker, 4-5 cigarettes a day for 7 years, quit 40 years ago, denies alcohol or drug use. He works as a  at fire [employer] - works from 8 AM to 4PM    Current Meds:  acetaminophen   Tablet .. 975 milliGRAM(s) Oral every 8 hours PRN  aluminum hydroxide/magnesium hydroxide/simethicone Suspension 30 milliLiter(s) Oral four times a day PRN  atorvastatin 10 milliGRAM(s) Oral at bedtime  bisacodyl Suppository 10 milliGRAM(s) Rectal daily PRN  dextrose 40% Gel 15 Gram(s) Oral once PRN  dextrose 5%. 1000 milliLiter(s) IV Continuous <Continuous>  dextrose 50% Injectable 12.5 Gram(s) IV Push once  dextrose 50% Injectable 25 Gram(s) IV Push once  dextrose 50% Injectable 25 Gram(s) IV Push once  glucagon  Injectable 1 milliGRAM(s) IntraMuscular once PRN  HYDROmorphone PCA (1 mG/mL) 30 milliLiter(s) PCA Continuous PCA Continuous  HYDROmorphone PCA (5 mG/mL) Rescue Clinician Bolus 0.5 milliGRAM(s) IV Push every 2 hours PRN  insulin lispro (HumaLOG) corrective regimen sliding scale   SubCutaneous Before meals and at bedtime  lactated ringers. 1000 milliLiter(s) IV Continuous <Continuous>  magnesium hydroxide Suspension 30 milliLiter(s) Oral daily PRN  melatonin 5 milliGRAM(s) Oral at bedtime  naloxone Injectable 0.1 milliGRAM(s) IV Push every 3 minutes PRN  ondansetron Injectable 4 milliGRAM(s) IV Push every 6 hours PRN  polyethylene glycol 3350 17 Gram(s) Oral daily  senna 2 Tablet(s) Oral at bedtime      Allergies:  No Known Allergies      ROS:  Denies the following except as indicated.    General: decreased appetite, fatigue  Eyes: no vision changes in left eye  ENT: Throat pain, changes in voice,   CV: palpitations, SOB, CP, cough  GI: NVD, difficulty swallowing, abdominal pain  : polyuria, dysuria  Endo: temperature intolerance, decreased libido  MSK: weakness, joint pain  Skin: rash, dryness, diaphoresis  Heme: Easy bruising, bleeding  Neuro: HA, dizziness, lightheadedness  Psych: Anxiety, Depression    Vital Signs Last 24 Hrs  T(C): 37.5 (2020 08:14), Max: 37.5 (2020 16:41)  T(F): 99.5 (2020 08:14), Max: 99.5 (2020 16:41)  HR: 72 (2020 08:14) (64 - 89)  BP: 124/72 (2020 08:14) (102/66 - 166/85)  BP(mean): 75 (2020 16:32) (75 - 121)  RR: 15 (2020 08:14) (14 - 21)  SpO2: 97% (2020 08:14) (94% - 99%)  Height (cm): 172.72 ( @ 07:39)  Weight (kg): 102.1 ( @ 07:39)  BMI (kg/m2): 34.2 ( @ 07:39)      Constitutional: wn/wd in NAD.   HEENT: No proptosis or lid retraction, right eye blindness  Neck: no thyromegaly or palpable thyroid nodules   Respiratory: lungs CTAB.  Cardiovascular: regular rhythm, normal S1 and S2, no peripheral edema  GI: soft, NT/ND, no masses/HSM appreciated.  Neurology: no tremors, DTR 2+, moves extremities  Psychiatric: AAO x 3, normal affect/mood.  Ext: radial pulses intact, DP pulses intact, extremities warm      LABS:                        12.6   9.20  )-----------( 173      ( 2020 07:39 )             37.2     07-14    137  |  98  |  22  ----------------------------<  271<H>  4.5   |  27  |  1.37<H>    Ca    8.5      2020 07:39    TPro  7.3  /  Alb  4.3  /  TBili  0.4  /  DBili  x   /  AST  21  /  ALT  21  /  AlkPhos  75  07-12    PT/INR - ( 2020 16:38 )   PT: 11.9 sec;   INR: 0.99          PTT - ( 2020 16:38 )  PTT:32.4 sec  Urinalysis Basic - ( 2020 17:56 )    Color: Yellow / Appearance: Clear / S.020 / pH: x  Gluc: x / Ketone: NEGATIVE  / Bili: Negative / Urobili: 0.2 E.U./dL   Blood: x / Protein: NEGATIVE mg/dL / Nitrite: NEGATIVE   Leuk Esterase: NEGATIVE / RBC: x / WBC x   Sq Epi: x / Non Sq Epi: x / Bacteria: x            RADIOLOGY & ADDITIONAL STUDIES:  CAPILLARY BLOOD GLUCOSE      POCT Blood Glucose.: 228 mg/dL (2020 11:45)  POCT Blood Glucose.: 236 mg/dL (2020 06:55)  POCT Blood Glucose.: 281 mg/dL (2020 21:54)  POCT Blood Glucose.: 220 mg/dL (2020 17:07)  POCT Blood Glucose.: 195 mg/dL (2020 14:20)        A/P: 65M PMH of HTN, DM, HLD R eye blindness s/p retinal detachment surgery in , partial R knee replacement 2016 was admitted for surgical treatment for spinal stenosis. He is S/P TLIF L5-S1on 2020    1.  DM Type 2 - uncontrolled - with complications - stress induced hyperglycemia  - Hba1c 11.6  Cr/GFR 1.37/ 54  .1 kg with BMI 34.2    carb consistent diet  MEME Castro has been consulted  Please start on Lantus 20  units at night.   Please start on Lispro 3 units before each meal.    Please continue Lispro moderate dose sliding scale four times daily with meals and at bedtime    Pt's fingerstick glucose goal is 120 to 150    Will continue to monitor     For discharge, TBD    Pt can follow up at discharge with [facility] by calling  to make an appointment.   discussed case with   and updated primary team

## 2020-07-14 NOTE — DISCHARGE NOTE PROVIDER - NSDCCPCAREPLAN_GEN_ALL_CORE_FT
PRINCIPAL DISCHARGE DIAGNOSIS  Diagnosis: Spinal stenosis, unspecified spinal region  Assessment and Plan of Treatment:       SECONDARY DISCHARGE DIAGNOSES  Diagnosis: Foot drop, left  Assessment and Plan of Treatment:

## 2020-07-14 NOTE — DISCHARGE NOTE PROVIDER - NSDCACTIVITY_GEN_ALL_CORE
No heavy lifting/straining/Stairs allowed/Do not drive or operate machinery/Do not make important decisions Walking - Indoors allowed/No heavy lifting/straining/Do not drive or operate machinery/Do not make important decisions/Stairs allowed

## 2020-07-14 NOTE — CONSULT NOTE ADULT - ASSESSMENT
Pain Management Consult Note - [facility] [phone]      Chief Complaint: Lower Back Pain      HPI: Patient seen and examined today. Patient with a history of diabetes, hypertension, spinal stenosis, HLD, retinal detachment, CHAMP, left foot drop, s/p TLIF L5-S1. Patient reports lower back pain and surgical site pain worse with activity. Patient reports pain was managed overnight with Dilaudid PCA. Patient Axox3, no s/s of oversedation. Dressing c,d,i, x1 drain. Patient reports vomiting x1 this am and was only able to tolerated x1 jello today. Discussed plan to continue PCA x1 more day.        Pain is __x_ sharp ____dull ___burning x___achy ___ Intensity: ____ mild __x_mod _x__severe     Location __x__surgical site ____cervical __x___lumbar ____abd ____upper ext____lower ext    Worse with ___x_activity _x___movement _____physical therapy___ Rest    Improved with _x___medication _x___rest ____physical therapy      ROS: Const:  _-__febrile   Eyes:___ENT:___CV: _-__chest pain  Resp: _-___sob  GI:__-_nausea _x__vomiting -___abd pain ___npo x___clears __full diet __bm  :___ Musk: _x__pain _-__spasm  Skin:___ Neuro:  _-__ygttphrt__-_sjqoczbhd__-_ numbness _-__weakness __-_paresth  Psych:_-_anxiety  Endo:___ Heme:___Allergy:_________, _x__all others reviewed and negative      PAST MEDICAL & SURGICAL HISTORY:  Diabetes  Hypertension  No significant past surgical history      SH: _-__Tobacco   _-__Alcohol                          FH:FAMILY HISTORY:      acetaminophen   Tablet .. 975 milliGRAM(s) Oral every 8 hours PRN  aluminum hydroxide/magnesium hydroxide/simethicone Suspension 30 milliLiter(s) Oral four times a day PRN  atorvastatin 10 milliGRAM(s) Oral at bedtime  bisacodyl Suppository 10 milliGRAM(s) Rectal daily PRN  dextrose 40% Gel 15 Gram(s) Oral once PRN  dextrose 5%. 1000 milliLiter(s) IV Continuous <Continuous>  dextrose 50% Injectable 12.5 Gram(s) IV Push once  dextrose 50% Injectable 25 Gram(s) IV Push once  dextrose 50% Injectable 25 Gram(s) IV Push once  glucagon  Injectable 1 milliGRAM(s) IntraMuscular once PRN  HYDROmorphone PCA (1 mG/mL) 30 milliLiter(s) PCA Continuous PCA Continuous  HYDROmorphone PCA (5 mG/mL) Rescue Clinician Bolus 0.5 milliGRAM(s) IV Push every 2 hours PRN  insulin lispro (HumaLOG) corrective regimen sliding scale   SubCutaneous Before meals and at bedtime  lactated ringers. 1000 milliLiter(s) IV Continuous <Continuous>  magnesium hydroxide Suspension 30 milliLiter(s) Oral daily PRN  melatonin 5 milliGRAM(s) Oral at bedtime  naloxone Injectable 0.1 milliGRAM(s) IV Push every 3 minutes PRN  ondansetron Injectable 4 milliGRAM(s) IV Push every 6 hours PRN  polyethylene glycol 3350 17 Gram(s) Oral daily  senna 2 Tablet(s) Oral at bedtime      T(C): 37.5 (07-14-20 @ 08:14), Max: 37.5 (07-13-20 @ 16:41)  HR: 72 (07-14-20 @ 08:14) (72 - 89)  BP: 124/72 (07-14-20 @ 08:14) (102/66 - 150/81)  RR: 15 (07-14-20 @ 08:14) (15 - 21)  SpO2: 97% (07-14-20 @ 08:14) (94% - 99%)  Wt(kg): --    T(C): 37.5 (07-14-20 @ 08:14), Max: 37.5 (07-13-20 @ 16:41)  HR: 72 (07-14-20 @ 08:14) (72 - 89)  BP: 124/72 (07-14-20 @ 08:14) (102/66 - 150/81)  RR: 15 (07-14-20 @ 08:14) (15 - 21)  SpO2: 97% (07-14-20 @ 08:14) (94% - 99%)  Wt(kg): --    T(C): 37.5 (07-14-20 @ 08:14), Max: 37.5 (07-13-20 @ 16:41)  HR: 72 (07-14-20 @ 08:14) (72 - 89)  BP: 124/72 (07-14-20 @ 08:14) (102/66 - 150/81)  RR: 15 (07-14-20 @ 08:14) (15 - 21)  SpO2: 97% (07-14-20 @ 08:14) (94% - 99%)  Wt(kg): --      PHYSICAL EXAM:  Gen Appearance: _x__no acute distress _x__appropriate        Neuro: _x__SILT feet____ EOM Intact Psych: AAOX_3_, _x__mood/affect appropriate        Eyes: _x__conjunctiva WNL  __x___ Pupils equal and round        ENT: __x_ears and nose atraumatic_x__ Hearing grossly intact        Neck: _x__trachea midline, no visible masses ___thyroid without palpable mass    Resp: _x__Nml WOB____No tactile fremitus ___clear to auscultation    Cardio: _x__extremities free from edema _x___pedal pulses palpable    GI/Abdomen: _x__soft __x___ Nontender____x__Nondistended_____HSM    Lymphatic: ___no palpable nodes in neck  ___no palpable nodes calves and feet    Skin/Wound: ___Incision, __x_Dressing c/d/i,   ____surrounding tissues soft,  _x__drain/chest tube present____    Muscular: EHL 5___/5  Gastrocnemius_5__/5    ___absent clubbing/cyanosis        ASSESSMENT: This is a 65y old Male with a history of diabetes, hypertension, spinal stenosis, HLD, retinal detachment, CHAMP, left foot drop, s/p TLIF L5-S1 POD#1, pain controlled with Dilaudid PCA.           Recommended Treatment PLAN:  1. Continue Dilaudid PCA 0.2mg, [id number] 9mg 4hour max,   2. Dilaudid 0.5mg Q2h IVP prn breakthrough pain  3. D/C PCA tommorow am when patient can tolerate PO better  4. Flexeril 5mg Po Q8h prn muscle spasms  5. tylenol 975mg PO Q8h standing  Plan discussed with Dr. Taylor

## 2020-07-14 NOTE — CONSULT NOTE ADULT - ATTENDING COMMENTS
Pt seen on rounds this afternoon.   Is s/p decompressive surgery for severe spinal stenosis with radicular pain and foot drop. Pt seen on rounds this afternoon.   Is s/p decompressive surgery for severe spinal stenosis with radicular pain and foot drop.  (His foot drop has actually resolved completely since his surgery).  Glycemic control was obviously extremely poor prior to the surgery, mostly due to multiple diet lapses, but also due to an insulin regimen which was suboptimal in terms of his eating schedule.  His glucoses since surgery have been moderately elevated to the low 200 range on sliding scale only--will start on basal bolus insulin with 20 units Lantus hs, but only 3 units lispro premeal due to his poor PO intake (likely secondary to nausea from narcotics)  Will try to convince him to accept basal/bolus therapy post discharge.  He realizes that he needs to get his diet into line.  Emphasized to him that the status of his retinopathy is highly dependent on his glycemic control (more than any other complication) and that he has already lost vision in his R eye

## 2020-07-15 LAB
ANION GAP SERPL CALC-SCNC: 10 MMOL/L — SIGNIFICANT CHANGE UP (ref 5–17)
BASOPHILS # BLD AUTO: 0.02 K/UL — SIGNIFICANT CHANGE UP (ref 0–0.2)
BASOPHILS NFR BLD AUTO: 0.2 % — SIGNIFICANT CHANGE UP (ref 0–2)
BUN SERPL-MCNC: 18 MG/DL — SIGNIFICANT CHANGE UP (ref 7–23)
CALCIUM SERPL-MCNC: 8.6 MG/DL — SIGNIFICANT CHANGE UP (ref 8.4–10.5)
CHLORIDE SERPL-SCNC: 98 MMOL/L — SIGNIFICANT CHANGE UP (ref 96–108)
CO2 SERPL-SCNC: 26 MMOL/L — SIGNIFICANT CHANGE UP (ref 22–31)
CREAT SERPL-MCNC: 1.15 MG/DL — SIGNIFICANT CHANGE UP (ref 0.5–1.3)
EOSINOPHIL # BLD AUTO: 0.06 K/UL — SIGNIFICANT CHANGE UP (ref 0–0.5)
EOSINOPHIL NFR BLD AUTO: 0.6 % — SIGNIFICANT CHANGE UP (ref 0–6)
GLUCOSE BLDC GLUCOMTR-MCNC: 125 MG/DL — HIGH (ref 70–99)
GLUCOSE BLDC GLUCOMTR-MCNC: 205 MG/DL — HIGH (ref 70–99)
GLUCOSE BLDC GLUCOMTR-MCNC: 212 MG/DL — HIGH (ref 70–99)
GLUCOSE BLDC GLUCOMTR-MCNC: 240 MG/DL — HIGH (ref 70–99)
GLUCOSE SERPL-MCNC: 209 MG/DL — HIGH (ref 70–99)
HCT VFR BLD CALC: 34.5 % — LOW (ref 39–50)
HGB BLD-MCNC: 11.7 G/DL — LOW (ref 13–17)
IMM GRANULOCYTES NFR BLD AUTO: 0.5 % — SIGNIFICANT CHANGE UP (ref 0–1.5)
LYMPHOCYTES # BLD AUTO: 1.07 K/UL — SIGNIFICANT CHANGE UP (ref 1–3.3)
LYMPHOCYTES # BLD AUTO: 11.2 % — LOW (ref 13–44)
MCHC RBC-ENTMCNC: 30.5 PG — SIGNIFICANT CHANGE UP (ref 27–34)
MCHC RBC-ENTMCNC: 33.9 GM/DL — SIGNIFICANT CHANGE UP (ref 32–36)
MCV RBC AUTO: 89.8 FL — SIGNIFICANT CHANGE UP (ref 80–100)
MONOCYTES # BLD AUTO: 1.36 K/UL — HIGH (ref 0–0.9)
MONOCYTES NFR BLD AUTO: 14.3 % — HIGH (ref 2–14)
NEUTROPHILS # BLD AUTO: 6.98 K/UL — SIGNIFICANT CHANGE UP (ref 1.8–7.4)
NEUTROPHILS NFR BLD AUTO: 73.2 % — SIGNIFICANT CHANGE UP (ref 43–77)
NRBC # BLD: 0 /100 WBCS — SIGNIFICANT CHANGE UP (ref 0–0)
PLATELET # BLD AUTO: 152 K/UL — SIGNIFICANT CHANGE UP (ref 150–400)
POTASSIUM SERPL-MCNC: 3.9 MMOL/L — SIGNIFICANT CHANGE UP (ref 3.5–5.3)
POTASSIUM SERPL-SCNC: 3.9 MMOL/L — SIGNIFICANT CHANGE UP (ref 3.5–5.3)
RBC # BLD: 3.84 M/UL — LOW (ref 4.2–5.8)
RBC # FLD: 12.9 % — SIGNIFICANT CHANGE UP (ref 10.3–14.5)
SODIUM SERPL-SCNC: 134 MMOL/L — LOW (ref 135–145)
WBC # BLD: 9.54 K/UL — SIGNIFICANT CHANGE UP (ref 3.8–10.5)
WBC # FLD AUTO: 9.54 K/UL — SIGNIFICANT CHANGE UP (ref 3.8–10.5)

## 2020-07-15 PROCEDURE — 99232 SBSQ HOSP IP/OBS MODERATE 35: CPT

## 2020-07-15 PROCEDURE — 99232 SBSQ HOSP IP/OBS MODERATE 35: CPT | Mod: GC

## 2020-07-15 RX ORDER — INSULIN GLARGINE 100 [IU]/ML
28 INJECTION, SOLUTION SUBCUTANEOUS AT BEDTIME
Refills: 0 | Status: DISCONTINUED | OUTPATIENT
Start: 2020-07-15 | End: 2020-07-16

## 2020-07-15 RX ORDER — OXYCODONE HYDROCHLORIDE 5 MG/1
10 TABLET ORAL EVERY 4 HOURS
Refills: 0 | Status: DISCONTINUED | OUTPATIENT
Start: 2020-07-15 | End: 2020-07-16

## 2020-07-15 RX ORDER — HYDROMORPHONE HYDROCHLORIDE 2 MG/ML
0.5 INJECTION INTRAMUSCULAR; INTRAVENOUS; SUBCUTANEOUS
Refills: 0 | Status: DISCONTINUED | OUTPATIENT
Start: 2020-07-15 | End: 2020-07-16

## 2020-07-15 RX ORDER — OXYCODONE HYDROCHLORIDE 5 MG/1
5 TABLET ORAL EVERY 4 HOURS
Refills: 0 | Status: DISCONTINUED | OUTPATIENT
Start: 2020-07-15 | End: 2020-07-16

## 2020-07-15 RX ORDER — INSULIN LISPRO 100/ML
10 VIAL (ML) SUBCUTANEOUS
Refills: 0 | Status: DISCONTINUED | OUTPATIENT
Start: 2020-07-15 | End: 2020-07-16

## 2020-07-15 RX ADMIN — POLYETHYLENE GLYCOL 3350 17 GRAM(S): 17 POWDER, FOR SOLUTION ORAL at 11:16

## 2020-07-15 RX ADMIN — OXYCODONE HYDROCHLORIDE 10 MILLIGRAM(S): 5 TABLET ORAL at 17:01

## 2020-07-15 RX ADMIN — OXYCODONE HYDROCHLORIDE 10 MILLIGRAM(S): 5 TABLET ORAL at 08:57

## 2020-07-15 RX ADMIN — Medication 975 MILLIGRAM(S): at 12:39

## 2020-07-15 RX ADMIN — INSULIN GLARGINE 28 UNIT(S): 100 INJECTION, SOLUTION SUBCUTANEOUS at 21:37

## 2020-07-15 RX ADMIN — Medication 3 UNIT(S): at 06:41

## 2020-07-15 RX ADMIN — Medication 10 UNIT(S): at 17:53

## 2020-07-15 RX ADMIN — Medication 3 UNIT(S): at 12:13

## 2020-07-15 RX ADMIN — CYCLOBENZAPRINE HYDROCHLORIDE 5 MILLIGRAM(S): 10 TABLET, FILM COATED ORAL at 10:33

## 2020-07-15 RX ADMIN — OXYCODONE HYDROCHLORIDE 10 MILLIGRAM(S): 5 TABLET ORAL at 20:56

## 2020-07-15 RX ADMIN — Medication 5 MILLIGRAM(S): at 21:00

## 2020-07-15 RX ADMIN — OXYCODONE HYDROCHLORIDE 10 MILLIGRAM(S): 5 TABLET ORAL at 17:30

## 2020-07-15 RX ADMIN — Medication 975 MILLIGRAM(S): at 05:57

## 2020-07-15 RX ADMIN — Medication 975 MILLIGRAM(S): at 06:30

## 2020-07-15 RX ADMIN — OXYCODONE HYDROCHLORIDE 10 MILLIGRAM(S): 5 TABLET ORAL at 09:20

## 2020-07-15 RX ADMIN — CYCLOBENZAPRINE HYDROCHLORIDE 5 MILLIGRAM(S): 10 TABLET, FILM COATED ORAL at 15:37

## 2020-07-15 RX ADMIN — Medication 4: at 06:40

## 2020-07-15 RX ADMIN — ATORVASTATIN CALCIUM 10 MILLIGRAM(S): 80 TABLET, FILM COATED ORAL at 21:00

## 2020-07-15 RX ADMIN — Medication 975 MILLIGRAM(S): at 21:00

## 2020-07-15 RX ADMIN — OXYCODONE HYDROCHLORIDE 10 MILLIGRAM(S): 5 TABLET ORAL at 13:19

## 2020-07-15 RX ADMIN — OXYCODONE HYDROCHLORIDE 10 MILLIGRAM(S): 5 TABLET ORAL at 21:56

## 2020-07-15 RX ADMIN — Medication 4: at 21:37

## 2020-07-15 RX ADMIN — Medication 975 MILLIGRAM(S): at 22:12

## 2020-07-15 RX ADMIN — SENNA PLUS 2 TABLET(S): 8.6 TABLET ORAL at 21:01

## 2020-07-15 RX ADMIN — OXYCODONE HYDROCHLORIDE 10 MILLIGRAM(S): 5 TABLET ORAL at 12:59

## 2020-07-15 RX ADMIN — Medication 4: at 12:12

## 2020-07-15 NOTE — ADVANCED PRACTICE NURSE CONSULT - RECOMMEDATIONS
CDCES reviewed:  Use of plate method for meal planning  Correct use of insulin pen ( including site selection and rotation as well as hygiene)  Sick day rules.    SBGM goals  Pt demonstrated, while verbalizing,  understanding on the use of insulin pen  Pt also demonstrated understanding of all concepts reviewed.    Handouts on plate method, living with T2DM, pictorial instructions for use of insulin pen provided

## 2020-07-15 NOTE — ADVANCED PRACTICE NURSE CONSULT - REASON FOR CONSULT
[facility] was contacted by NANY CASTILLO ( endocrine fellow) re: pt with uncontrolled DM.  Dr. Kim informed that pts home regimen will now include basal/bolus regimen as opposed to mixed insulin ( i.e. 70/30 which he was using previously-insulin pen). Pt uses glucose meter at home - on occassion

## 2020-07-15 NOTE — PROGRESS NOTE ADULT - SUBJECTIVE AND OBJECTIVE BOX
Ortho Note    Pt comfortable without complaints, pain moderately controlled  Denies CP, SOB, N/V, numbness/tingling     Vital Signs Last 24 Hrs  T(C): 36.9 (07-15-20 @ 08:33), Max: 37.2 (07-15-20 @ 05:28)  T(F): 98.5 (07-15-20 @ 08:33), Max: 99 (07-15-20 @ 05:28)  HR: 88 (07-15-20 @ 08:33) (74 - 88)  BP: 129/73 (07-15-20 @ 08:33) (129/73 - 132/62)  BP(mean): --  RR: 15 (07-15-20 @ 08:33) (15 - 16)  SpO2: 96% (07-15-20 @ 08:33) (96% - 96%)  AVSS    General: Pt Alert and oriented, NAD  DSG: lumbar Prevena to suction, HV x1 (70cc/150cc)  Pulses: bilateral pedal 2+, wwp toes, cap refill <3sec toes  Sensation: bilateral SILT  Motor: bilateral 5/5 EHL/FHL/TA/GS                          11.7   9.54  )-----------( 152      ( 15 Jul 2020 06:34 )             34.5   15 Jul 2020 06:34    134    |  98     |  18     ----------------------------<  209    3.9     |  26     |  1.15     Ca    8.6        15 Jul 2020 06:34        A/P: 65y Male POD#2 s/p TLIF L5-S1  - DM type II: appreciate Endo recs  - Pain Control: appreciate PM recs  - DVT ppx: SCDS  - PT, WBS: WBAT spinal precautions  - Bowel regimen: continue bowel regimen  - Dispo: pending PT clearance and drain output    Ortho Pager [phone]

## 2020-07-15 NOTE — ADVANCED PRACTICE NURSE CONSULT - ASSESSMENT
CDCES noted pts knowledge deficit re: use of insulin pen ( pt did not prime pen, pt was not holding in place for count of 10 - when asked to demonstrated ( with models))  At pt request, pt wished for a simple plan for meals.

## 2020-07-15 NOTE — PROGRESS NOTE ADULT - SUBJECTIVE AND OBJECTIVE BOX
Pain Management Progress Note - [facility] [phone]      HPI: Patient seen and examined today. Patient with a history of diabetes, hypertension, spinal stenosis, HLD, retinal detachment, CHAMP, left foot drop, s/p TLIF L5-S1 POD#2. Patient reports lower back pain and surgical site pain worse with activity, pain managed overnight with Dilaudid PCA. Patient Axox3, no s/s of oversedation. Dressing c,d,i, x1 drain. Patient oob in the chair today. Discussed plan to d/c PCA and to switch to an oral pain medication regimen. Patient verbalized understanding.       Pain is __x_ sharp ____dull ___burning x___achy ___ Intensity: ____ mild __x_mod _x__severe     Location __x__surgical site ____cervical __x___lumbar ____abd ____upper ext____lower ext    Worse with ___x_activity _x___movement _____physical therapy___ Rest    Improved with _x___medication _x___rest ____physical therapy      lactated ringers.  acetaminophen   Tablet ..  oxyCODONE    IR  oxyCODONE    IR  HYDROmorphone  Injectable  aluminum hydroxide/magnesium hydroxide/simethicone Suspension  ondansetron Injectable  magnesium hydroxide Suspension  polyethylene glycol 3350  bisacodyl Suppository  insulin lispro (HumaLOG) corrective regimen sliding scale  dextrose 5%.  dextrose 40% Gel  dextrose 50% Injectable  dextrose 50% Injectable  dextrose 50% Injectable  glucagon  Injectable  lisinopril  atorvastatin  melatonin  povidone iodine 5% Nasal Swab  chlorhexidine 2% Cloths  HYDROmorphone PCA (5 mG/mL) Rescue Clinician Bolus  HYDROmorphone PCA (1 mG/mL)  naloxone Injectable  HYDROmorphone  Injectable  ceFAZolin   IVPB  senna  (Floorstock)  cyclobenzaprine  acetaminophen   Tablet ..  insulin glargine Injectable (LANTUS)  insulin lispro Injectable (HumaLOG)  insulin lispro (HumaLOG) corrective regimen sliding scale  dextrose 5%.  dextrose 40% Gel  dextrose 50% Injectable  dextrose 50% Injectable  dextrose 50% Injectable  glucagon  Injectable  oxyCODONE    IR  oxyCODONE    IR      ROS: Const:  _-__febrile   Eyes:___ENT:___CV: _-__chest pain  Resp: _-___sob  GI:__-_nausea _x__vomiting -___abd pain ___npo ___clears x__full diet __bm  :___ Musk: _x__pain _-__spasm  Skin:___ Neuro:  _-__fyhqwkpj__-_poupafahj__-_ numbness _-__weakness __-_paresth  Psych:_-_anxiety  Endo:___ Heme:___Allergy:_________, _x__all others reviewed and negative      PAST MEDICAL & SURGICAL HISTORY:  Diabetes  Hypertension  No significant past surgical history      07-15 @ 06:3467 mL/min/1.73M2      Hemoglobin: 11.7 g/dL (07-15 @ 06:34)  Hemoglobin: 12.6 g/dL (07-14 @ 07:39)        T(C): 36.9 (07-15-20 @ 08:33), Max: 37.3 (07-14-20 @ 20:15)  HR: 88 (07-15-20 @ 08:33) (74 - 99)  BP: 129/73 (07-15-20 @ 08:33) (114/70 - 132/62)  RR: 15 (07-15-20 @ 08:33) (15 - 17)  SpO2: 96% (07-15-20 @ 08:33) (96% - 98%)  Wt(kg): --       PHYSICAL EXAM:  Gen Appearance: _x__no acute distress _x__appropriate        Neuro: _x__SILT feet____ EOM Intact Psych: AAOX_3_, _x__mood/affect appropriate        Eyes: _x__conjunctiva WNL  __x___ Pupils equal and round        ENT: __x_ears and nose atraumatic_x__ Hearing grossly intact        Neck: _x__trachea midline, no visible masses ___thyroid without palpable mass    Resp: _x__Nml WOB____No tactile fremitus ___clear to auscultation    Cardio: _x__extremities free from edema _x___pedal pulses palpable    GI/Abdomen: _x__soft __x___ Nontender____x__Nondistended_____HSM    Lymphatic: ___no palpable nodes in neck  ___no palpable nodes calves and feet    Skin/Wound: ___Incision, __x_Dressing c/d/i,   ____surrounding tissues soft,  _x__drain/chest tube present____    Muscular: EHL 5___/5  Gastrocnemius_5__/5    ___absent clubbing/cyanosis        ASSESSMENT: This is a 65y old Male with a history of diabetes, hypertension, spinal stenosis, HLD, retinal detachment, CHAMP, left foot drop, s/p TLIF L5-S1 POD#2, pain managed with current pain medication regimen.           Recommended Treatment PLAN:  1. D/c Dilaudid PCA  2. Oxycodone 5-10mg Po Q4h prn moderate to severe pain  3. Dilaudid 0.5mg Q2h IVP prn breakthrough pain  4. D/C PCA tomorrow am when patient can tolerate PO better  5. Flexeril 5mg Po Q8h prn muscle spasms  6. tylenol 975mg PO Q8h standing  Plan discussed with Dr. Taylor Pain Management Progress Note - [facility] [phone]      HPI: Patient seen and examined today. Patient with a history of diabetes, hypertension, spinal stenosis, HLD, retinal detachment, CHAMP, left foot drop, s/p TLIF L5-S1 POD#2. Patient reports lower back pain and surgical site pain worse with activity, pain managed overnight with Dilaudid PCA. Patient Axox3, no s/s of oversedation. Dressing c,d,i, x1 drain. Patient oob in the chair today. Discussed plan to d/c PCA and to switch to an oral pain medication regimen. Patient verbalized understanding.       Pain is __x_ sharp ____dull ___burning x___achy ___ Intensity: ____ mild __x_mod _x__severe     Location __x__surgical site ____cervical __x___lumbar ____abd ____upper ext____lower ext    Worse with ___x_activity _x___movement _____physical therapy___ Rest    Improved with _x___medication _x___rest ____physical therapy      lactated ringers.  acetaminophen   Tablet ..  oxyCODONE    IR  oxyCODONE    IR  HYDROmorphone  Injectable  aluminum hydroxide/magnesium hydroxide/simethicone Suspension  ondansetron Injectable  magnesium hydroxide Suspension  polyethylene glycol 3350  bisacodyl Suppository  insulin lispro (HumaLOG) corrective regimen sliding scale  dextrose 5%.  dextrose 40% Gel  dextrose 50% Injectable  dextrose 50% Injectable  dextrose 50% Injectable  glucagon  Injectable  lisinopril  atorvastatin  melatonin  povidone iodine 5% Nasal Swab  chlorhexidine 2% Cloths  HYDROmorphone PCA (5 mG/mL) Rescue Clinician Bolus  HYDROmorphone PCA (1 mG/mL)  naloxone Injectable  HYDROmorphone  Injectable  ceFAZolin   IVPB  senna  (Floorstock)  cyclobenzaprine  acetaminophen   Tablet ..  insulin glargine Injectable (LANTUS)  insulin lispro Injectable (HumaLOG)  insulin lispro (HumaLOG) corrective regimen sliding scale  dextrose 5%.  dextrose 40% Gel  dextrose 50% Injectable  dextrose 50% Injectable  dextrose 50% Injectable  glucagon  Injectable  oxyCODONE    IR  oxyCODONE    IR      ROS: Const:  _-__febrile   Eyes:___ENT:___CV: _-__chest pain  Resp: _-___sob  GI:__-_nausea _x__vomiting -___abd pain ___npo ___clears x__full diet __bm  :___ Musk: _x__pain _-__spasm  Skin:___ Neuro:  _-__rmmiuseq__-_vrqudxraw__-_ numbness _-__weakness __-_paresth  Psych:_-_anxiety  Endo:___ Heme:___Allergy:_________, _x__all others reviewed and negative      PAST MEDICAL & SURGICAL HISTORY:  Diabetes  Hypertension  No significant past surgical history      07-15 @ 06:3467 mL/min/1.73M2      Hemoglobin: 11.7 g/dL (07-15 @ 06:34)  Hemoglobin: 12.6 g/dL (07-14 @ 07:39)        T(C): 36.9 (07-15-20 @ 08:33), Max: 37.3 (07-14-20 @ 20:15)  HR: 88 (07-15-20 @ 08:33) (74 - 99)  BP: 129/73 (07-15-20 @ 08:33) (114/70 - 132/62)  RR: 15 (07-15-20 @ 08:33) (15 - 17)  SpO2: 96% (07-15-20 @ 08:33) (96% - 98%)  Wt(kg): --       PHYSICAL EXAM:  Gen Appearance: _x__no acute distress _x__appropriate        Neuro: _x__SILT feet____ EOM Intact Psych: AAOX_3_, _x__mood/affect appropriate        Eyes: _x__conjunctiva WNL  __x___ Pupils equal and round        ENT: __x_ears and nose atraumatic_x__ Hearing grossly intact        Neck: _x__trachea midline, no visible masses ___thyroid without palpable mass    Resp: _x__Nml WOB____No tactile fremitus ___clear to auscultation    Cardio: _x__extremities free from edema _x___pedal pulses palpable    GI/Abdomen: _x__soft __x___ Nontender____x__Nondistended_____HSM    Lymphatic: ___no palpable nodes in neck  ___no palpable nodes calves and feet    Skin/Wound: ___Incision, __x_Dressing c/d/i,   ____surrounding tissues soft,  _x__drain/chest tube present____    Muscular: EHL 5___/5  Gastrocnemius_5__/5    ___absent clubbing/cyanosis        ASSESSMENT: This is a 65y old Male with a history of diabetes, hypertension, spinal stenosis, HLD, retinal detachment, CHAMP, left foot drop, s/p TLIF L5-S1 POD#2, pain managed with current pain medication regimen.           Recommended Treatment PLAN:  1. D/c Dilaudid PCA  2. Oxycodone 5-10mg Po Q4h prn moderate to severe pain  3. Dilaudid 0.5mg Q2h IVP prn breakthrough pain  4. Flexeril 5mg Po Q8h prn muscle spasms  5. tylenol 975mg PO Q8h standing  Plan discussed with Dr. Taylor

## 2020-07-15 NOTE — PROGRESS NOTE ADULT - SUBJECTIVE AND OBJECTIVE BOX
INTERVAL HPI/OVERNIGHT EVENTS:    S/P TLIF L5-S1 POD 2. Moderate pain, but ambulating with PT. Appetite has improved.    FSG & insulin:  7/14:  Dinner     Lispro 3+4    Ate salmon, rice, carrots, 1 strawberry  Bedtime    Lantus  20  lispro  4   7/15:  Breakfast    Lispro 3+4  Ate eggs, sausage, 1 strawberry     Lunch FSG     Lispro   Ate    Pt reports the following symptoms:    CONSTITUTIONAL:  Negative fever or chills, feels well, good appetite  EYES:  Negative  blurry vision or double vision  CARDIOVASCULAR:  Negative for chest pain or palpitations  RESPIRATORY:  Negative for cough, wheezing, or SOB   GASTROINTESTINAL:  Negative for nausea, vomiting, diarrhea, constipation, or abdominal pain  GENITOURINARY:  Negative frequency, urgency or dysuria  NEUROLOGIC:  No headache, confusion, dizziness, lightheadedness    MEDICATIONS  (STANDING):  acetaminophen   Tablet .. 975 milliGRAM(s) Oral every 8 hours  atorvastatin 10 milliGRAM(s) Oral at bedtime  dextrose 5%. 1000 milliLiter(s) (50 mL/Hr) IV Continuous <Continuous>  dextrose 5%. 1000 milliLiter(s) (50 mL/Hr) IV Continuous <Continuous>  dextrose 50% Injectable 12.5 Gram(s) IV Push once  dextrose 50% Injectable 25 Gram(s) IV Push once  dextrose 50% Injectable 25 Gram(s) IV Push once  insulin glargine Injectable (LANTUS) 20 Unit(s) SubCutaneous at bedtime  insulin lispro (HumaLOG) corrective regimen sliding scale   SubCutaneous Before meals and at bedtime  insulin lispro Injectable (HumaLOG) 3 Unit(s) SubCutaneous three times a day before meals  melatonin 5 milliGRAM(s) Oral at bedtime  polyethylene glycol 3350 17 Gram(s) Oral daily  senna 2 Tablet(s) Oral at bedtime    MEDICATIONS  (PRN):  acetaminophen   Tablet .. 975 milliGRAM(s) Oral every 8 hours PRN Temp greater or equal to 38C (100.4F), Mild Pain (1 - 3)  aluminum hydroxide/magnesium hydroxide/simethicone Suspension 30 milliLiter(s) Oral four times a day PRN Indigestion  bisacodyl Suppository 10 milliGRAM(s) Rectal daily PRN If no bowel movement by postoperative day #2  cyclobenzaprine 5 milliGRAM(s) Oral three times a day PRN Muscle Spasm  dextrose 40% Gel 15 Gram(s) Oral once PRN Blood Glucose LESS THAN 70 milliGRAM(s)/deciliter  glucagon  Injectable 1 milliGRAM(s) IntraMuscular once PRN Glucose LESS THAN 70 milligrams/deciliter  glucagon  Injectable 1 milliGRAM(s) IntraMuscular once PRN Glucose LESS THAN 70 milligrams/deciliter  magnesium hydroxide Suspension 30 milliLiter(s) Oral daily PRN Constipation  naloxone Injectable 0.1 milliGRAM(s) IV Push every 3 minutes PRN For ANY of the following changes in patient status:  A. RR LESS THAN 10 breaths per minute, B. Oxygen saturation LESS THAN 90%, C. Sedation score of 6  ondansetron Injectable 4 milliGRAM(s) IV Push every 6 hours PRN Nausea and/or Vomiting  oxyCODONE    IR 5 milliGRAM(s) Oral every 4 hours PRN Moderate Pain (4 - 6)  oxyCODONE    IR 10 milliGRAM(s) Oral every 4 hours PRN Severe Pain (7 - 10)      PHYSICAL EXAM  Vital Signs Last 24 Hrs  T(C): 36.9 (15 Jul 2020 08:33), Max: 37.3 (14 Jul 2020 20:15)  T(F): 98.5 (15 Jul 2020 08:33), Max: 99.2 (14 Jul 2020 20:15)  HR: 88 (15 Jul 2020 08:33) (74 - 99)  BP: 129/73 (15 Jul 2020 08:33) (114/70 - 132/62)  BP(mean): --  RR: 15 (15 Jul 2020 08:33) (15 - 17)  SpO2: 96% (15 Jul 2020 08:33) (96% - 98%)    Constitutional: wn/wd in NAD.   HEENT: No proptosis or lid retraction, right eye blindness  Neck: no thyromegaly or palpable thyroid nodules   Respiratory: lungs CTAB.  Cardiovascular: regular rhythm, normal S1 and S2, no peripheral edema  GI: soft, NT/ND, no masses/HSM appreciated.  Neurology: no tremors, DTR 2+, moves extremities  Psychiatric: AAO x 3, normal affect/mood.  Ext: radial pulses intact, DP pulses intact, extremities warm    LABS:                        11.7   9.54  )-----------( 152      ( 15 Jul 2020 06:34 )             34.5     07-15    134<L>  |  98  |  18  ----------------------------<  209<H>  3.9   |  26  |  1.15    Ca    8.6      15 Jul 2020 06:34    HbA1C:   CAPILLARY BLOOD GLUCOSE      POCT Blood Glucose.: 212 mg/dL (15 Jul 2020 06:38)  POCT Blood Glucose.: 252 mg/dL (14 Jul 2020 21:45)  POCT Blood Glucose.: 214 mg/dL (14 Jul 2020 16:32)  POCT Blood Glucose.: 228 mg/dL (14 Jul 2020 11:45)    Will discuss in rounds  A/P: 65M PMH of HTN, DM, HLD R eye blindness s/p retinal detachment surgery in 2014, partial R knee replacement 2016 was admitted for surgical treatment for spinal stenosis. He is S/P TLIF L5-S1on 7/13/2020    1.  DM Type 2 - uncontrolled - with complications - stress induced hyperglycemia  - Hba1c 11.6  Cr/GFR 1.37/ 54  .1 kg with BMI 34.2    carb consistent diet  CDE Gloria has been consulted  Please start on Lantus   units at night.   Please start on Lispro units before each meal.    Please continue Lispro moderate dose sliding scale four times daily with meals and at bedtime    Pt's fingerstick glucose goal is 120 to 150    Will continue to monitor     For discharge, TBD    Pt can follow up at discharge with [facility] by calling  to make an appointment.   discussed case with   and updated primary teamTo Make an appointment at [address] [place] [address] for the patient, either:  1. Send a STAT task via [employer] to Annette Hough or Nkechi Cohen (office managers)   OR  2. Call supervisor's line. P: [phone] (do not give this number to patient). VM is checked periodically.  In the message, specify that this is a hospital discharge follow-up, and that the appt can be made with a NP if there is no timely MD availability.    REMINDERS FOR INSULIN/DIABETES SUPPLIES at DISCHARGE:  INSULIN:   Long actin/Basal Insulin: Examples: Toujeo, Basaglar, Tresiba, Lantus   Short acting/Bolus Insulin: Humalog, Admelog, Novolog  Please ensure that BOTH short acting and long acting insulin are prescribed in the same preparation (Ex: PEN vs VIAL/SOLUTION)     TESTING SUPPLIES:   All glucometer supplies should be written as generic to avoid issues with insurance. Use the free text option in sunrise prescription writer, and type in glucometer test strips, lancets, etc to order.    If sending patient home on insulin PEN, please send:   •	BD bertha insulin pen needles for use up to 4 times daily (total quantity 100)  •	Lancets for use up to 4 times daily (total quantity 100)  •	Glucometer Test strips for use up to 4 times daily (total quantity 100)  •	Alcohol swabs for use up to 4 times daily (total quantity 100)  •	Glucometer (If provided by hospital, still provide scripts for lancets, test strips, and swabs)  If sending patient home on insulin VIAL, please send:   •	Insulin syringes (6mm) - for use up to 4 times daily (total quantity 100)  •	Lancets for use up to 4 times daily (total quantity 100)  •	Generic Glucometer Test strips for use up to 4 times daily (total quantity 100)  •	Alcohol swabs for use up to 4 times daily (total quantity 100)  •	Generic Glucometer (If provided by hospital, still provide scripts for lancets, test strips, and swabs)  •	Do not specify brand for testing supplies (such as contour, freestyle, one touch etc) that way the pharmacy has the freedom to pick and change according to what the insurance dictates.  For patients without insurance:   •	Provide social work with appropriate scripts so they may obtain 1 week of samples  •	Provide with glucometer. Glucometers are located at various nursing stations, the nursing office, education, and endocrine fellows office.  •	Please make appointment with Becca Gonzalez NP or Deisy Conway RN and BETTIE Christopher at the [address] [place] [address] endocrinology clinic. They can see patients without insurance, provide appropriate samples, and assist in getting insurance coverage.     PREFERRED PHARMACY:  [facility] (located on 1st floor next to admitting)  P: [phone]  Hours: M – F 8AM – 8PM, Sat 8AM – 4PM, Sun—closed  If not using [facility], please follow up with chosen pharmacy to ensure insulin prescribed is covered. INTERVAL HPI/OVERNIGHT EVENTS:    S/P TLIF L5-S1 POD 2. Moderate pain, but ambulating with PT. Appetite has improved.    FSG & insulin:  7/14:  Dinner     Lispro 3+4    Ate salmon, rice, carrots, 1 strawberry  Bedtime    Lantus  20  lispro  4   7/15:  Breakfast    Lispro 3+4  Ate eggs, sausage, 1 strawberry     Lunch FSG  205   Lispro 3+4.    Pt reports the following symptoms:    CONSTITUTIONAL:  Negative fever or chills, feels well, good appetite  EYES:  Negative  blurry vision or double vision  CARDIOVASCULAR:  Negative for chest pain or palpitations  RESPIRATORY:  Negative for cough, wheezing, or SOB   GASTROINTESTINAL:  Negative for nausea, vomiting, diarrhea, constipation, or abdominal pain  GENITOURINARY:  Negative frequency, urgency or dysuria  NEUROLOGIC:  No headache, confusion, dizziness, lightheadedness    MEDICATIONS  (STANDING):  acetaminophen   Tablet .. 975 milliGRAM(s) Oral every 8 hours  atorvastatin 10 milliGRAM(s) Oral at bedtime  dextrose 5%. 1000 milliLiter(s) (50 mL/Hr) IV Continuous <Continuous>  dextrose 5%. 1000 milliLiter(s) (50 mL/Hr) IV Continuous <Continuous>  dextrose 50% Injectable 12.5 Gram(s) IV Push once  dextrose 50% Injectable 25 Gram(s) IV Push once  dextrose 50% Injectable 25 Gram(s) IV Push once  insulin glargine Injectable (LANTUS) 20 Unit(s) SubCutaneous at bedtime  insulin lispro (HumaLOG) corrective regimen sliding scale   SubCutaneous Before meals and at bedtime  insulin lispro Injectable (HumaLOG) 3 Unit(s) SubCutaneous three times a day before meals  melatonin 5 milliGRAM(s) Oral at bedtime  polyethylene glycol 3350 17 Gram(s) Oral daily  senna 2 Tablet(s) Oral at bedtime    MEDICATIONS  (PRN):  acetaminophen   Tablet .. 975 milliGRAM(s) Oral every 8 hours PRN Temp greater or equal to 38C (100.4F), Mild Pain (1 - 3)  aluminum hydroxide/magnesium hydroxide/simethicone Suspension 30 milliLiter(s) Oral four times a day PRN Indigestion  bisacodyl Suppository 10 milliGRAM(s) Rectal daily PRN If no bowel movement by postoperative day #2  cyclobenzaprine 5 milliGRAM(s) Oral three times a day PRN Muscle Spasm  dextrose 40% Gel 15 Gram(s) Oral once PRN Blood Glucose LESS THAN 70 milliGRAM(s)/deciliter  glucagon  Injectable 1 milliGRAM(s) IntraMuscular once PRN Glucose LESS THAN 70 milligrams/deciliter  glucagon  Injectable 1 milliGRAM(s) IntraMuscular once PRN Glucose LESS THAN 70 milligrams/deciliter  magnesium hydroxide Suspension 30 milliLiter(s) Oral daily PRN Constipation  naloxone Injectable 0.1 milliGRAM(s) IV Push every 3 minutes PRN For ANY of the following changes in patient status:  A. RR LESS THAN 10 breaths per minute, B. Oxygen saturation LESS THAN 90%, C. Sedation score of 6  ondansetron Injectable 4 milliGRAM(s) IV Push every 6 hours PRN Nausea and/or Vomiting  oxyCODONE    IR 5 milliGRAM(s) Oral every 4 hours PRN Moderate Pain (4 - 6)  oxyCODONE    IR 10 milliGRAM(s) Oral every 4 hours PRN Severe Pain (7 - 10)      PHYSICAL EXAM  Vital Signs Last 24 Hrs  T(C): 36.9 (15 Jul 2020 08:33), Max: 37.3 (14 Jul 2020 20:15)  T(F): 98.5 (15 Jul 2020 08:33), Max: 99.2 (14 Jul 2020 20:15)  HR: 88 (15 Jul 2020 08:33) (74 - 99)  BP: 129/73 (15 Jul 2020 08:33) (114/70 - 132/62)  BP(mean): --  RR: 15 (15 Jul 2020 08:33) (15 - 17)  SpO2: 96% (15 Jul 2020 08:33) (96% - 98%)    Constitutional: wn/wd in NAD.   HEENT: No proptosis or lid retraction, right eye blindness  Neck: no thyromegaly or palpable thyroid nodules   Respiratory: lungs CTAB.  Cardiovascular: regular rhythm, normal S1 and S2, no peripheral edema  GI: soft, NT/ND, no masses/HSM appreciated.  Neurology: no tremors, DTR 2+, moves extremities  Psychiatric: AAO x 3, normal affect/mood.  Ext: radial pulses intact, DP pulses intact, extremities warm    LABS:                        11.7   9.54  )-----------( 152      ( 15 Jul 2020 06:34 )             34.5     07-15    134<L>  |  98  |  18  ----------------------------<  209<H>  3.9   |  26  |  1.15    Ca    8.6      15 Jul 2020 06:34    HbA1C:   CAPILLARY BLOOD GLUCOSE      POCT Blood Glucose.: 212 mg/dL (15 Jul 2020 06:38)  POCT Blood Glucose.: 252 mg/dL (14 Jul 2020 21:45)  POCT Blood Glucose.: 214 mg/dL (14 Jul 2020 16:32)  POCT Blood Glucose.: 228 mg/dL (14 Jul 2020 11:45)    Will discuss in rounds  A/P: 65M PMH of HTN, DM, HLD R eye blindness s/p retinal detachment surgery in 2014, partial R knee replacement 2016 was admitted for surgical treatment for spinal stenosis. He is S/P TLIF L5-S1on 7/13/2020    1.  DM Type 2 - uncontrolled - with complications - hyperglycemia  - Hba1c 11.6  Cr/GFR 1.37/ 54  .1 kg with BMI 34.2    carb consistent diet  CDE Gloria has been consulted  Please increase Lantus to 28   units at night.   Please increase Lispro to 10 units before each meal.    Please continue Lispro moderate dose sliding scale four times daily with meals and at bedtime    Pt's fingerstick glucose goal is 120 to 150    Will continue to monitor     For discharge, basals/bolus-dosage TBD.    ---APPT PENDING---Pt can follow up at discharge with [facility] by calling  to make an appointment.   discussed case with   and updated primary team    To Make an appointment at [address] [place] [address] for the patient, either:  1. Send a STAT task via [employer] to Annette Hough or Nkechi Cohen (office managers)   OR  2. Call supervisor's line. P: [phone] (do not give this number to patient). VM is checked periodically.  In the message, specify that this is a hospital discharge follow-up, and that the appt can be made with a NP if there is no timely MD availability.    REMINDERS FOR INSULIN/DIABETES SUPPLIES at DISCHARGE:  INSULIN:   Long actin/Basal Insulin: Examples: Toujeo, Basaglar, Tresiba, Lantus   Short acting/Bolus Insulin: Humalog, Admelog, Novolog  Please ensure that BOTH short acting and long acting insulin are prescribed in the same preparation (Ex: PEN vs VIAL/SOLUTION)     TESTING SUPPLIES:   All glucometer supplies should be written as generic to avoid issues with insurance. Use the free text option in sunrise prescription writer, and type in glucometer test strips, lancets, etc to order.    If sending patient home on insulin PEN, please send:   •	BD bertha insulin pen needles for use up to 4 times daily (total quantity 100)  •	Lancets for use up to 4 times daily (total quantity 100)  •	Glucometer Test strips for use up to 4 times daily (total quantity 100)  •	Alcohol swabs for use up to 4 times daily (total quantity 100)  •	Glucometer (If provided by hospital, still provide scripts for lancets, test strips, and swabs)  If sending patient home on insulin VIAL, please send:   •	Insulin syringes (6mm) - for use up to 4 times daily (total quantity 100)  •	Lancets for use up to 4 times daily (total quantity 100)  •	Generic Glucometer Test strips for use up to 4 times daily (total quantity 100)  •	Alcohol swabs for use up to 4 times daily (total quantity 100)  •	Generic Glucometer (If provided by hospital, still provide scripts for lancets, test strips, and swabs)  •	Do not specify brand for testing supplies (such as contour, freestyle, one touch etc) that way the pharmacy has the freedom to pick and change according to what the insurance dictates.  For patients without insurance:   •	Provide social work with appropriate scripts so they may obtain 1 week of samples  •	Provide with glucometer. Glucometers are located at various nursing stations, the nursing office, education, and endocrine fellows office.  •	Please make appointment with Becca Gonzalez NP or Deisy Conway RN and BETTIE Christopher at the [address] [place] [address] endocrinology clinic. They can see patients without insurance, provide appropriate samples, and assist in getting insurance coverage.     PREFERRED PHARMACY:  [email or web address] (located on 1st floor next to admitting)  P: [phone]  Hours: M – F 8AM – 8PM, Sat 8AM – 4PM, Sun—closed  If not using [facility], please follow up with chosen pharmacy to ensure insulin prescribed is covered.

## 2020-07-15 NOTE — PROGRESS NOTE ADULT - SUBJECTIVE AND OBJECTIVE BOX
INTERVAL HPI/OVERNIGHT EVENTS: MINERVA O/N    SUBJECTIVE: Patient seen and examined at bedside.   Overall feels well. Eager to return home today. States pain is controlled. Ambulated w PT and recommended for home. States N/V resolved and was able to eat dinner last night. No fever, chest pain, dyspnea.    OBJECTIVE:    VITAL SIGNS:  ICU Vital Signs Last 24 Hrs  T(C): 36.9 (15 Jul 2020 08:33), Max: 37.3 (14 Jul 2020 20:15)  T(F): 98.5 (15 Jul 2020 08:33), Max: 99.2 (14 Jul 2020 20:15)  HR: 88 (15 Jul 2020 08:33) (74 - 99)  BP: 129/73 (15 Jul 2020 08:33) (114/70 - 132/62)  BP(mean): --  ABP: --  ABP(mean): --  RR: 15 (15 Jul 2020 08:33) (15 - 17)  SpO2: 96% (15 Jul 2020 08:33) (96% - 98%)        07-14 @ 07:01  -  07-15 @ 07:00  --------------------------------------------------------  IN: 1800 mL / OUT: 2450 mL / NET: -650 mL      CAPILLARY BLOOD GLUCOSE      POCT Blood Glucose.: 205 mg/dL (15 Jul 2020 12:05)      PHYSICAL EXAM:  GEN: Male in NAD  HEENT: NC/AT, MMM  CV: RRR, nml S1S2 no murmurs, no BLE edema  PULM: Nml effort, CTAB  ABD: Soft, NABS, non-tender  NEURO: Alert, moving all extremities. 5/5 in BLE and BUE w sensation intact  PSYCH: Appropriate  SKIN: Dressing in back intact; 2 accordian drains w dark drainage      MEDICATIONS:  MEDICATIONS  (STANDING):  acetaminophen   Tablet .. 975 milliGRAM(s) Oral every 8 hours  atorvastatin 10 milliGRAM(s) Oral at bedtime  dextrose 5%. 1000 milliLiter(s) (50 mL/Hr) IV Continuous <Continuous>  dextrose 5%. 1000 milliLiter(s) (50 mL/Hr) IV Continuous <Continuous>  dextrose 50% Injectable 12.5 Gram(s) IV Push once  dextrose 50% Injectable 25 Gram(s) IV Push once  dextrose 50% Injectable 25 Gram(s) IV Push once  insulin glargine Injectable (LANTUS) 20 Unit(s) SubCutaneous at bedtime  insulin lispro (HumaLOG) corrective regimen sliding scale   SubCutaneous Before meals and at bedtime  insulin lispro Injectable (HumaLOG) 3 Unit(s) SubCutaneous three times a day before meals  melatonin 5 milliGRAM(s) Oral at bedtime  polyethylene glycol 3350 17 Gram(s) Oral daily  senna 2 Tablet(s) Oral at bedtime    MEDICATIONS  (PRN):  acetaminophen   Tablet .. 975 milliGRAM(s) Oral every 8 hours PRN Temp greater or equal to 38C (100.4F), Mild Pain (1 - 3)  aluminum hydroxide/magnesium hydroxide/simethicone Suspension 30 milliLiter(s) Oral four times a day PRN Indigestion  bisacodyl Suppository 10 milliGRAM(s) Rectal daily PRN If no bowel movement by postoperative day #2  cyclobenzaprine 5 milliGRAM(s) Oral three times a day PRN Muscle Spasm  dextrose 40% Gel 15 Gram(s) Oral once PRN Blood Glucose LESS THAN 70 milliGRAM(s)/deciliter  glucagon  Injectable 1 milliGRAM(s) IntraMuscular once PRN Glucose LESS THAN 70 milligrams/deciliter  glucagon  Injectable 1 milliGRAM(s) IntraMuscular once PRN Glucose LESS THAN 70 milligrams/deciliter  magnesium hydroxide Suspension 30 milliLiter(s) Oral daily PRN Constipation  naloxone Injectable 0.1 milliGRAM(s) IV Push every 3 minutes PRN For ANY of the following changes in patient status:  A. RR LESS THAN 10 breaths per minute, B. Oxygen saturation LESS THAN 90%, C. Sedation score of 6  ondansetron Injectable 4 milliGRAM(s) IV Push every 6 hours PRN Nausea and/or Vomiting  oxyCODONE    IR 5 milliGRAM(s) Oral every 4 hours PRN Moderate Pain (4 - 6)  oxyCODONE    IR 10 milliGRAM(s) Oral every 4 hours PRN Severe Pain (7 - 10)      ALLERGIES:  Allergies    No Known Allergies    Intolerances        LABS:                        11.7   9.54  )-----------( 152      ( 15 Jul 2020 06:34 )             34.5     07-15    134<L>  |  98  |  18  ----------------------------<  209<H>  3.9   |  26  |  1.15    Ca    8.6      15 Jul 2020 06:34            RADIOLOGY & ADDITIONAL TESTS: Reviewed.

## 2020-07-16 ENCOUNTER — TRANSCRIPTION ENCOUNTER (OUTPATIENT)
Age: 65
End: 2020-07-16

## 2020-07-16 VITALS
HEART RATE: 85 BPM | DIASTOLIC BLOOD PRESSURE: 76 MMHG | OXYGEN SATURATION: 100 % | SYSTOLIC BLOOD PRESSURE: 121 MMHG | TEMPERATURE: 98 F | RESPIRATION RATE: 18 BRPM

## 2020-07-16 LAB
ANION GAP SERPL CALC-SCNC: 11 MMOL/L — SIGNIFICANT CHANGE UP (ref 5–17)
BASOPHILS # BLD AUTO: 0.02 K/UL — SIGNIFICANT CHANGE UP (ref 0–0.2)
BASOPHILS NFR BLD AUTO: 0.2 % — SIGNIFICANT CHANGE UP (ref 0–2)
BUN SERPL-MCNC: 13 MG/DL — SIGNIFICANT CHANGE UP (ref 7–23)
CALCIUM SERPL-MCNC: 8.4 MG/DL — SIGNIFICANT CHANGE UP (ref 8.4–10.5)
CHLORIDE SERPL-SCNC: 100 MMOL/L — SIGNIFICANT CHANGE UP (ref 96–108)
CO2 SERPL-SCNC: 27 MMOL/L — SIGNIFICANT CHANGE UP (ref 22–31)
CREAT SERPL-MCNC: 1.06 MG/DL — SIGNIFICANT CHANGE UP (ref 0.5–1.3)
EOSINOPHIL # BLD AUTO: 0.11 K/UL — SIGNIFICANT CHANGE UP (ref 0–0.5)
EOSINOPHIL NFR BLD AUTO: 1.2 % — SIGNIFICANT CHANGE UP (ref 0–6)
GLUCOSE BLDC GLUCOMTR-MCNC: 159 MG/DL — HIGH (ref 70–99)
GLUCOSE BLDC GLUCOMTR-MCNC: 190 MG/DL — HIGH (ref 70–99)
GLUCOSE SERPL-MCNC: 165 MG/DL — HIGH (ref 70–99)
HCT VFR BLD CALC: 32.8 % — LOW (ref 39–50)
HGB BLD-MCNC: 11.2 G/DL — LOW (ref 13–17)
IMM GRANULOCYTES NFR BLD AUTO: 0.3 % — SIGNIFICANT CHANGE UP (ref 0–1.5)
LYMPHOCYTES # BLD AUTO: 0.89 K/UL — LOW (ref 1–3.3)
LYMPHOCYTES # BLD AUTO: 10.1 % — LOW (ref 13–44)
MCHC RBC-ENTMCNC: 31 PG — SIGNIFICANT CHANGE UP (ref 27–34)
MCHC RBC-ENTMCNC: 34.1 GM/DL — SIGNIFICANT CHANGE UP (ref 32–36)
MCV RBC AUTO: 90.9 FL — SIGNIFICANT CHANGE UP (ref 80–100)
MONOCYTES # BLD AUTO: 1.19 K/UL — HIGH (ref 0–0.9)
MONOCYTES NFR BLD AUTO: 13.5 % — SIGNIFICANT CHANGE UP (ref 2–14)
NEUTROPHILS # BLD AUTO: 6.58 K/UL — SIGNIFICANT CHANGE UP (ref 1.8–7.4)
NEUTROPHILS NFR BLD AUTO: 74.7 % — SIGNIFICANT CHANGE UP (ref 43–77)
NRBC # BLD: 0 /100 WBCS — SIGNIFICANT CHANGE UP (ref 0–0)
PLATELET # BLD AUTO: 161 K/UL — SIGNIFICANT CHANGE UP (ref 150–400)
POTASSIUM SERPL-MCNC: 3.5 MMOL/L — SIGNIFICANT CHANGE UP (ref 3.5–5.3)
POTASSIUM SERPL-SCNC: 3.5 MMOL/L — SIGNIFICANT CHANGE UP (ref 3.5–5.3)
RBC # BLD: 3.61 M/UL — LOW (ref 4.2–5.8)
RBC # FLD: 12.7 % — SIGNIFICANT CHANGE UP (ref 10.3–14.5)
SODIUM SERPL-SCNC: 138 MMOL/L — SIGNIFICANT CHANGE UP (ref 135–145)
SURGICAL PATHOLOGY STUDY: SIGNIFICANT CHANGE UP
WBC # BLD: 8.82 K/UL — SIGNIFICANT CHANGE UP (ref 3.8–10.5)
WBC # FLD AUTO: 8.82 K/UL — SIGNIFICANT CHANGE UP (ref 3.8–10.5)

## 2020-07-16 PROCEDURE — 99232 SBSQ HOSP IP/OBS MODERATE 35: CPT

## 2020-07-16 PROCEDURE — 99231 SBSQ HOSP IP/OBS SF/LOW 25: CPT | Mod: GC

## 2020-07-16 RX ORDER — ATORVASTATIN CALCIUM 80 MG/1
1 TABLET, FILM COATED ORAL
Qty: 30 | Refills: 0
Start: 2020-07-16 | End: 2020-08-14

## 2020-07-16 RX ORDER — INSULIN GLARGINE 100 [IU]/ML
30 INJECTION, SOLUTION SUBCUTANEOUS
Qty: 1 | Refills: 0
Start: 2020-07-16 | End: 2020-08-14

## 2020-07-16 RX ORDER — INSULIN LISPRO 100/ML
12 VIAL (ML) SUBCUTANEOUS
Qty: 1 | Refills: 0
Start: 2020-07-16 | End: 2020-08-14

## 2020-07-16 RX ORDER — ACETAMINOPHEN 500 MG
3 TABLET ORAL
Qty: 0 | Refills: 0 | DISCHARGE
Start: 2020-07-16

## 2020-07-16 RX ORDER — OXYCODONE HYDROCHLORIDE 5 MG/1
1 TABLET ORAL
Qty: 40 | Refills: 0
Start: 2020-07-16 | End: 2020-07-22

## 2020-07-16 RX ORDER — INSULIN LISPRO 100/ML
12 VIAL (ML) SUBCUTANEOUS
Refills: 0 | Status: DISCONTINUED | OUTPATIENT
Start: 2020-07-16 | End: 2020-07-16

## 2020-07-16 RX ORDER — INSULIN GLARGINE 100 [IU]/ML
30 INJECTION, SOLUTION SUBCUTANEOUS AT BEDTIME
Refills: 0 | Status: DISCONTINUED | OUTPATIENT
Start: 2020-07-16 | End: 2020-07-16

## 2020-07-16 RX ORDER — CYCLOBENZAPRINE HYDROCHLORIDE 10 MG/1
1 TABLET, FILM COATED ORAL
Qty: 21 | Refills: 0
Start: 2020-07-16 | End: 2020-07-22

## 2020-07-16 RX ORDER — POLYETHYLENE GLYCOL 3350 17 G/17G
17 POWDER, FOR SOLUTION ORAL
Qty: 0 | Refills: 0 | DISCHARGE
Start: 2020-07-16

## 2020-07-16 RX ORDER — SENNA PLUS 8.6 MG/1
2 TABLET ORAL
Qty: 0 | Refills: 0 | DISCHARGE
Start: 2020-07-16

## 2020-07-16 RX ORDER — LISINOPRIL 2.5 MG/1
1 TABLET ORAL
Qty: 30 | Refills: 0
Start: 2020-07-16 | End: 2020-08-14

## 2020-07-16 RX ADMIN — OXYCODONE HYDROCHLORIDE 10 MILLIGRAM(S): 5 TABLET ORAL at 04:14

## 2020-07-16 RX ADMIN — OXYCODONE HYDROCHLORIDE 10 MILLIGRAM(S): 5 TABLET ORAL at 05:15

## 2020-07-16 RX ADMIN — OXYCODONE HYDROCHLORIDE 10 MILLIGRAM(S): 5 TABLET ORAL at 09:47

## 2020-07-16 RX ADMIN — Medication 975 MILLIGRAM(S): at 14:08

## 2020-07-16 RX ADMIN — Medication 10 UNIT(S): at 07:44

## 2020-07-16 RX ADMIN — OXYCODONE HYDROCHLORIDE 10 MILLIGRAM(S): 5 TABLET ORAL at 10:15

## 2020-07-16 RX ADMIN — Medication 975 MILLIGRAM(S): at 14:13

## 2020-07-16 RX ADMIN — CYCLOBENZAPRINE HYDROCHLORIDE 5 MILLIGRAM(S): 10 TABLET, FILM COATED ORAL at 11:25

## 2020-07-16 RX ADMIN — OXYCODONE HYDROCHLORIDE 10 MILLIGRAM(S): 5 TABLET ORAL at 14:30

## 2020-07-16 RX ADMIN — Medication 975 MILLIGRAM(S): at 05:20

## 2020-07-16 RX ADMIN — OXYCODONE HYDROCHLORIDE 10 MILLIGRAM(S): 5 TABLET ORAL at 14:07

## 2020-07-16 RX ADMIN — Medication 2: at 10:48

## 2020-07-16 RX ADMIN — Medication 975 MILLIGRAM(S): at 06:05

## 2020-07-16 RX ADMIN — Medication 10 UNIT(S): at 10:48

## 2020-07-16 RX ADMIN — Medication 2: at 07:44

## 2020-07-16 NOTE — PROGRESS NOTE ADULT - SUBJECTIVE AND OBJECTIVE BOX
INTERVAL HPI/OVERNIGHT EVENTS: MINERVA O/N    SUBJECTIVE: Patient seen and examined at bedside.   Pt states he is feeling well. Eager to return home today. States pain is well controlled. +Flatus wo BM. No issues w urination. Denies fever, chest pain, dyspnea. Eating wo N/V/Abd pain.    OBJECTIVE:    VITAL SIGNS:  ICU Vital Signs Last 24 Hrs  T(C): 36.7 (16 Jul 2020 05:24), Max: 37.1 (15 Jul 2020 15:37)  T(F): 98.1 (16 Jul 2020 05:24), Max: 98.8 (15 Jul 2020 15:37)  HR: 88 (16 Jul 2020 05:24) (72 - 94)  BP: 130/74 (16 Jul 2020 05:24) (119/58 - 131/70)  BP(mean): --  ABP: --  ABP(mean): --  RR: 18 (16 Jul 2020 05:24) (16 - 18)  SpO2: 100% (16 Jul 2020 05:24) (95% - 100%)        07-15 @ 07:01  -  07-16 @ 07:00  --------------------------------------------------------  IN: 0 mL / OUT: 105 mL / NET: -105 mL      CAPILLARY BLOOD GLUCOSE      POCT Blood Glucose.: 190 mg/dL (16 Jul 2020 10:34)      PHYSICAL EXAM:  GEN: Male in NAD  HEENT: NC/AT, MMM  CV: RRR, nml S1S2 no murmurs, no BLE edema  PULM: Nml effort, CTAB  ABD: Soft, NABS, non-tender  NEURO: Alert, moving all extremities. 5/5 in BLE and BUE w sensation intact  PSYCH: Appropriate  SKIN: Dressing in back intact    MEDICATIONS:  MEDICATIONS  (STANDING):  acetaminophen   Tablet .. 975 milliGRAM(s) Oral every 8 hours  atorvastatin 10 milliGRAM(s) Oral at bedtime  dextrose 5%. 1000 milliLiter(s) (50 mL/Hr) IV Continuous <Continuous>  dextrose 5%. 1000 milliLiter(s) (50 mL/Hr) IV Continuous <Continuous>  dextrose 50% Injectable 12.5 Gram(s) IV Push once  dextrose 50% Injectable 25 Gram(s) IV Push once  dextrose 50% Injectable 25 Gram(s) IV Push once  insulin glargine Injectable (LANTUS) 30 Unit(s) SubCutaneous at bedtime  insulin lispro (HumaLOG) corrective regimen sliding scale   SubCutaneous Before meals and at bedtime  insulin lispro Injectable (HumaLOG) 12 Unit(s) SubCutaneous three times a day before meals  melatonin 5 milliGRAM(s) Oral at bedtime  polyethylene glycol 3350 17 Gram(s) Oral daily  senna 2 Tablet(s) Oral at bedtime    MEDICATIONS  (PRN):  acetaminophen   Tablet .. 975 milliGRAM(s) Oral every 8 hours PRN Temp greater or equal to 38C (100.4F), Mild Pain (1 - 3)  aluminum hydroxide/magnesium hydroxide/simethicone Suspension 30 milliLiter(s) Oral four times a day PRN Indigestion  bisacodyl Suppository 10 milliGRAM(s) Rectal daily PRN If no bowel movement by postoperative day #2  cyclobenzaprine 5 milliGRAM(s) Oral three times a day PRN Muscle Spasm  dextrose 40% Gel 15 Gram(s) Oral once PRN Blood Glucose LESS THAN 70 milliGRAM(s)/deciliter  glucagon  Injectable 1 milliGRAM(s) IntraMuscular once PRN Glucose LESS THAN 70 milligrams/deciliter  glucagon  Injectable 1 milliGRAM(s) IntraMuscular once PRN Glucose LESS THAN 70 milligrams/deciliter  HYDROmorphone  Injectable 0.5 milliGRAM(s) IV Push every 2 hours PRN breakthrough pain  magnesium hydroxide Suspension 30 milliLiter(s) Oral daily PRN Constipation  naloxone Injectable 0.1 milliGRAM(s) IV Push every 3 minutes PRN For ANY of the following changes in patient status:  A. RR LESS THAN 10 breaths per minute, B. Oxygen saturation LESS THAN 90%, C. Sedation score of 6  ondansetron Injectable 4 milliGRAM(s) IV Push every 6 hours PRN Nausea and/or Vomiting  oxyCODONE    IR 5 milliGRAM(s) Oral every 4 hours PRN Moderate Pain (4 - 6)  oxyCODONE    IR 10 milliGRAM(s) Oral every 4 hours PRN Severe Pain (7 - 10)      ALLERGIES:  Allergies    No Known Allergies    Intolerances        LABS:                        11.2   8.82  )-----------( 161      ( 16 Jul 2020 07:08 )             32.8     07-16    138  |  100  |  13  ----------------------------<  165<H>  3.5   |  27  |  1.06    Ca    8.4      16 Jul 2020 07:08            RADIOLOGY & ADDITIONAL TESTS: Reviewed.

## 2020-07-16 NOTE — PROGRESS NOTE ADULT - SUBJECTIVE AND OBJECTIVE BOX
Patient seen, examined. Did well yesterday with physical therapy. Legs feel "much better."  voiding without difficulty.  Glucose values improving, endocrine input appreciated.  Examination unchanged motor, sensory, reflexes  no calf tenderness  drain output noted  imp:  POst op day 3  Plan for discharge to home today after drain removed  close endocrine followup for diabetic control  call for any questions, fevers chills or wound drainage  followup in 7-10 days  OK to shower, but keep sophy handheld device dry

## 2020-07-16 NOTE — PROGRESS NOTE ADULT - SUBJECTIVE AND OBJECTIVE BOX
INTERVAL HPI/OVERNIGHT EVENTS:    Patient is a 65y old  Male who presents with a chief complaint of same (2020 09:24)  pt was seen and examined at the bedside Pt denies any complaints today. Pt is planned fro discharge today.     FSG & Insulin received:    Yesterday:  pre-dinner fs  nutritional lispro  10 units   bedtime fs  lantus 28  units +  4  units lispro SS    Today:  pre-breakfast fs  nutritional lispro 10   units+ 2   units lispro SS  pre-lunch fs  nutritional lispro 10 units+ 2 units lispro SS      Pt reports the following symptoms:    CONSTITUTIONAL:  Negative fever or chills  CARDIOVASCULAR:  Negative for chest pain or palpitations  RESPIRATORY:  Negative for cough, wheezing, or SOB   GASTROINTESTINAL:  Negative for nausea, vomiting, diarrhea, constipation, or abdominal pain  GENITOURINARY:  Negative frequency, urgency or dysuria  NEUROLOGIC:  No headache, confusion, dizziness, lightheadedness    MEDICATIONS  (STANDING):  acetaminophen   Tablet .. 975 milliGRAM(s) Oral every 8 hours  atorvastatin 10 milliGRAM(s) Oral at bedtime  dextrose 5%. 1000 milliLiter(s) (50 mL/Hr) IV Continuous <Continuous>  dextrose 5%. 1000 milliLiter(s) (50 mL/Hr) IV Continuous <Continuous>  dextrose 50% Injectable 12.5 Gram(s) IV Push once  dextrose 50% Injectable 25 Gram(s) IV Push once  dextrose 50% Injectable 25 Gram(s) IV Push once  insulin glargine Injectable (LANTUS) 28 Unit(s) SubCutaneous at bedtime  insulin lispro (HumaLOG) corrective regimen sliding scale   SubCutaneous Before meals and at bedtime  insulin lispro Injectable (HumaLOG) 10 Unit(s) SubCutaneous three times a day before meals  melatonin 5 milliGRAM(s) Oral at bedtime  polyethylene glycol 3350 17 Gram(s) Oral daily  senna 2 Tablet(s) Oral at bedtime    MEDICATIONS  (PRN):  acetaminophen   Tablet .. 975 milliGRAM(s) Oral every 8 hours PRN Temp greater or equal to 38C (100.4F), Mild Pain (1 - 3)  aluminum hydroxide/magnesium hydroxide/simethicone Suspension 30 milliLiter(s) Oral four times a day PRN Indigestion  bisacodyl Suppository 10 milliGRAM(s) Rectal daily PRN If no bowel movement by postoperative day #2  cyclobenzaprine 5 milliGRAM(s) Oral three times a day PRN Muscle Spasm  dextrose 40% Gel 15 Gram(s) Oral once PRN Blood Glucose LESS THAN 70 milliGRAM(s)/deciliter  glucagon  Injectable 1 milliGRAM(s) IntraMuscular once PRN Glucose LESS THAN 70 milligrams/deciliter  glucagon  Injectable 1 milliGRAM(s) IntraMuscular once PRN Glucose LESS THAN 70 milligrams/deciliter  HYDROmorphone  Injectable 0.5 milliGRAM(s) IV Push every 2 hours PRN breakthrough pain  magnesium hydroxide Suspension 30 milliLiter(s) Oral daily PRN Constipation  naloxone Injectable 0.1 milliGRAM(s) IV Push every 3 minutes PRN For ANY of the following changes in patient status:  A. RR LESS THAN 10 breaths per minute, B. Oxygen saturation LESS THAN 90%, C. Sedation score of 6  ondansetron Injectable 4 milliGRAM(s) IV Push every 6 hours PRN Nausea and/or Vomiting  oxyCODONE    IR 5 milliGRAM(s) Oral every 4 hours PRN Moderate Pain (4 - 6)  oxyCODONE    IR 10 milliGRAM(s) Oral every 4 hours PRN Severe Pain (7 - 10)      Past medical history reviewed  Family history reviewed  Social history reviewed    PHYSICAL EXAM  Vital Signs Last 24 Hrs  T(C): 36.7 (2020 05:24), Max: 37.1 (15 Jul 2020 15:37)  T(F): 98.1 (2020 05:24), Max: 98.8 (15 Jul 2020 15:37)  HR: 88 (2020 05:24) (72 - 94)  BP: 130/74 (2020 05:24) (119/58 - 131/70)  BP(mean): --  RR: 18 (2020 05:24) (16 - 18)  SpO2: 100% (2020 05:24) (95% - 100%)    Constitutional: wn/wd in NAD.   Neck: no thyromegaly or palpable thyroid nodules   Respiratory: lungs CTAB.  Cardiovascular: regular rhythm, normal S1 and S2, no audible murmurs, no peripheral edema  GI: soft, NT/ND, no masses/HSM appreciated.  Neurology: no tremors, DTR 2+  Psychiatric: AAO x 3, normal affect/mood.    LABS:                        11.2   8.82  )-----------( 161      ( 2020 07:08 )             32.8     07-16    138  |  100  |  13  ----------------------------<  165<H>  3.5   |  27  |  1.06    Ca    8.4      2020 07:08              HbA1C: 11.6 % ( @ 06:25)      CAPILLARY BLOOD GLUCOSE      POCT Blood Glucose.: 190 mg/dL (2020 10:34)  POCT Blood Glucose.: 159 mg/dL (2020 06:48)  POCT Blood Glucose.: 240 mg/dL (15 Jul 2020 21:24)  POCT Blood Glucose.: 125 mg/dL (15 Jul 2020 16:57)  POCT Blood Glucose.: 205 mg/dL (15 Jul 2020 12:05)      Will discuss in rounds  A/P: 65M PMH of HTN, DM, HLD R eye blindness s/p retinal detachment surgery in , partial R knee replacement 2016 was admitted for surgical treatment for spinal stenosis. He is S/P TLIF L5-S1on 2020.    1.  DM Type 2 - uncontrolled - with complications - hyperglycemia  - Hba1c 11.6  Cr/GFR 1.37/ 54  .1 kg with BMI 34.2    carb consistent diet  MEME Castro has been consulted    For discharge:  Please increase Lantus to 30   units at night.   Please increase Lispro to 12 units before each meal.      Pt's fingerstick glucose goal is 120 to 150  Pt scheduled for follow up appointment at [facility]  with SANJEEV Ojeda on  at 10AM and Elliott Rizo at 11 AM.       discussed case with   and updated primary team    To Make an appointment at [address] [place] [address] for the patient, either:  1. Send a STAT task via Holidog to Annette Hough or Nkechi Cohen (office managers)   OR  2. Call supervisor's line. P: [phone] (do not give this number to patient). VM is checked periodically.  In the message, specify that this is a hospital discharge follow-up, and that the appt can be made with a NP if there is no timely MD availability.    REMINDERS FOR INSULIN/DIABETES SUPPLIES at DISCHARGE:  INSULIN:   Long actin/Basal Insulin: Examples: Toujeo, Basaglar, Tresiba, Lantus   Short acting/Bolus Insulin: Humalog, Admelog, Novolog  Please ensure that BOTH short acting and long acting insulin are prescribed in the same preparation (Ex: PEN vs VIAL/SOLUTION)     TESTING SUPPLIES:   All glucometer supplies should be written as generic to avoid issues with insurance. Use the free text option in sunrise prescription writer, and type in glucometer test strips, lancets, etc to order.    If sending patient home on insulin PEN, please send:   •	BD bertha insulin pen needles for use up to 4 times daily (total quantity 100)  •	Lancets for use up to 4 times daily (total quantity 100)  •	Glucometer Test strips for use up to 4 times daily (total quantity 100)  •	Alcohol swabs for use up to 4 times daily (total quantity 100)  •	Glucometer (If provided by hospital, still provide scripts for lancets, test strips, and swabs)  If sending patient home on insulin VIAL, please send:   •	Insulin syringes (6mm) - for use up to 4 times daily (total quantity 100)  •	Lancets for use up to 4 times daily (total quantity 100)  •	Generic Glucometer Test strips for use up to 4 times daily (total quantity 100)  •	Alcohol swabs for use up to 4 times daily (total quantity 100)  •	Generic Glucometer (If provided by hospital, still provide scripts for lancets, test strips, and swabs)  •	Do not specify brand for testing supplies (such as contour, freestyle, one touch etc) that way the pharmacy has the freedom to pick and change according to what the insurance dictates.  For patients without insurance:   •	Provide social work with appropriate scripts so they may obtain 1 week of samples  •	Provide with glucometer. Glucometers are located at various nursing stations, the nursing office, education, and endocrine fellows office.  •	Please make appointment with Becca Gonzalez NP or Deisy Conway RN and BETTIE Christopher at the [address] [place] [address] endocrinology clinic. They can see patients without insurance, provide appropriate samples, and assist in getting insurance coverage.     PREFERRED PHARMACY:  [facility] (located on 1st floor next to admitting)  P: [phone]  Hours: M – F 8AM – 8PM, Sat 8AM – 4PM, Sun—closed  If not using [facility], please follow up with chosen pharmacy to ensure insulin prescribed is covered. INTERVAL HPI/OVERNIGHT EVENTS:    Patient is a 65y old  Male who presents with a chief complaint of same (2020 09:24)  Pt is planned fro discharge today.     FSG & Insulin received:    Yesterday:  pre-dinner fs  nutritional lispro  10 units   bedtime fs  lantus 28  units +  4  units lispro SS    Today:  pre-breakfast fs  nutritional lispro 10   units+ 2   units lispro SS  pre-lunch fs  nutritional lispro 10 units+ 2 units lispro SS      Pt reports the following symptoms:    CONSTITUTIONAL:  Negative fever or chills  CARDIOVASCULAR:  Negative for chest pain or palpitations  RESPIRATORY:  Negative for cough, wheezing, or SOB   GASTROINTESTINAL:  Negative for nausea, vomiting, diarrhea, constipation, or abdominal pain  GENITOURINARY:  Negative frequency, urgency or dysuria  NEUROLOGIC:  No headache, confusion, dizziness, lightheadedness    MEDICATIONS  (STANDING):  acetaminophen   Tablet .. 975 milliGRAM(s) Oral every 8 hours  atorvastatin 10 milliGRAM(s) Oral at bedtime  dextrose 5%. 1000 milliLiter(s) (50 mL/Hr) IV Continuous <Continuous>  dextrose 5%. 1000 milliLiter(s) (50 mL/Hr) IV Continuous <Continuous>  dextrose 50% Injectable 12.5 Gram(s) IV Push once  dextrose 50% Injectable 25 Gram(s) IV Push once  dextrose 50% Injectable 25 Gram(s) IV Push once  insulin glargine Injectable (LANTUS) 28 Unit(s) SubCutaneous at bedtime  insulin lispro (HumaLOG) corrective regimen sliding scale   SubCutaneous Before meals and at bedtime  insulin lispro Injectable (HumaLOG) 10 Unit(s) SubCutaneous three times a day before meals  melatonin 5 milliGRAM(s) Oral at bedtime  polyethylene glycol 3350 17 Gram(s) Oral daily  senna 2 Tablet(s) Oral at bedtime    MEDICATIONS  (PRN):  acetaminophen   Tablet .. 975 milliGRAM(s) Oral every 8 hours PRN Temp greater or equal to 38C (100.4F), Mild Pain (1 - 3)  aluminum hydroxide/magnesium hydroxide/simethicone Suspension 30 milliLiter(s) Oral four times a day PRN Indigestion  bisacodyl Suppository 10 milliGRAM(s) Rectal daily PRN If no bowel movement by postoperative day #2  cyclobenzaprine 5 milliGRAM(s) Oral three times a day PRN Muscle Spasm  dextrose 40% Gel 15 Gram(s) Oral once PRN Blood Glucose LESS THAN 70 milliGRAM(s)/deciliter  glucagon  Injectable 1 milliGRAM(s) IntraMuscular once PRN Glucose LESS THAN 70 milligrams/deciliter  glucagon  Injectable 1 milliGRAM(s) IntraMuscular once PRN Glucose LESS THAN 70 milligrams/deciliter  HYDROmorphone  Injectable 0.5 milliGRAM(s) IV Push every 2 hours PRN breakthrough pain  magnesium hydroxide Suspension 30 milliLiter(s) Oral daily PRN Constipation  naloxone Injectable 0.1 milliGRAM(s) IV Push every 3 minutes PRN For ANY of the following changes in patient status:  A. RR LESS THAN 10 breaths per minute, B. Oxygen saturation LESS THAN 90%, C. Sedation score of 6  ondansetron Injectable 4 milliGRAM(s) IV Push every 6 hours PRN Nausea and/or Vomiting  oxyCODONE    IR 5 milliGRAM(s) Oral every 4 hours PRN Moderate Pain (4 - 6)  oxyCODONE    IR 10 milliGRAM(s) Oral every 4 hours PRN Severe Pain (7 - 10)      Past medical history reviewed  Family history reviewed  Social history reviewed    PHYSICAL EXAM  Vital Signs Last 24 Hrs  T(C): 36.7 (2020 05:24), Max: 37.1 (15 Jul 2020 15:37)  T(F): 98.1 (2020 05:24), Max: 98.8 (15 Jul 2020 15:37)  HR: 88 (2020 05:24) (72 - 94)  BP: 130/74 (2020 05:24) (119/58 - 131/70)  BP(mean): --  RR: 18 (2020 05:24) (16 - 18)  SpO2: 100% (2020 05:24) (95% - 100%)    Constitutional: wn/wd in NAD.   Neck: no thyromegaly or palpable thyroid nodules   Respiratory: lungs CTAB.  Cardiovascular: regular rhythm, normal S1 and S2, no audible murmurs, no peripheral edema  GI: soft, NT/ND, no masses/HSM appreciated.  Neurology: no tremors, DTR 2+  Psychiatric: AAO x 3, normal affect/mood.    LABS:                        11.2   8.82  )-----------( 161      ( 2020 07:08 )             32.8     07-16    138  |  100  |  13  ----------------------------<  165<H>  3.5   |  27  |  1.06    Ca    8.4      2020 07:08              HbA1C: 11.6 % ( @ 06:25)      CAPILLARY BLOOD GLUCOSE      POCT Blood Glucose.: 190 mg/dL (2020 10:34)  POCT Blood Glucose.: 159 mg/dL (2020 06:48)  POCT Blood Glucose.: 240 mg/dL (15 Jul 2020 21:24)  POCT Blood Glucose.: 125 mg/dL (15 Jul 2020 16:57)  POCT Blood Glucose.: 205 mg/dL (15 Jul 2020 12:05)      Will discuss in rounds  A/P: 65M PMH of HTN, DM, HLD R eye blindness s/p retinal detachment surgery in , partial R knee replacement 2016 was admitted for surgical treatment for spinal stenosis. He is S/P TLIF L5-S1on 2020.    1.  DM Type 2 - uncontrolled - with complications - hyperglycemia  - Hba1c 11.6  Cr/GFR 1.37/ 54  .1 kg with BMI 34.2    carb consistent diet  MEME Castro has been consulted    For discharge:  Please increase Lantus to 30   units at night.   Please increase Lispro to 12 units before each meal.      Pt's fingerstick glucose goal is 120 to 150  Pt scheduled for follow up appointment at [facility]  with SANJEEV Ojeda on friday,  at 10AM and Elliott Rizo at 11 AM.       discussed case with   and updated primary team    To Make an appointment at [address] [place] [address] for the patient, either:  1. Send a STAT task via Allscripts to Annette Hough or Nkechi Cohen (office managers)   OR  2. Call supervisor's line. P: [phone] (do not give this number to patient). VM is checked periodically.  In the message, specify that this is a hospital discharge follow-up, and that the appt can be made with a NP if there is no timely MD availability.    REMINDERS FOR INSULIN/DIABETES SUPPLIES at DISCHARGE:  INSULIN:   Long actin/Basal Insulin: Examples: Toujeo, Basaglar, Tresiba, Lantus   Short acting/Bolus Insulin: Humalog, Admelog, Novolog  Please ensure that BOTH short acting and long acting insulin are prescribed in the same preparation (Ex: PEN vs VIAL/SOLUTION)     TESTING SUPPLIES:   All glucometer supplies should be written as generic to avoid issues with insurance. Use the free text option in sunrise prescription writer, and type in glucometer test strips, lancets, etc to order.    If sending patient home on insulin PEN, please send:   •	BD bertha insulin pen needles for use up to 4 times daily (total quantity 100)  •	Lancets for use up to 4 times daily (total quantity 100)  •	Glucometer Test strips for use up to 4 times daily (total quantity 100)  •	Alcohol swabs for use up to 4 times daily (total quantity 100)  •	Glucometer (If provided by hospital, still provide scripts for lancets, test strips, and swabs)  If sending patient home on insulin VIAL, please send:   •	Insulin syringes (6mm) - for use up to 4 times daily (total quantity 100)  •	Lancets for use up to 4 times daily (total quantity 100)  •	Generic Glucometer Test strips for use up to 4 times daily (total quantity 100)  •	Alcohol swabs for use up to 4 times daily (total quantity 100)  •	Generic Glucometer (If provided by hospital, still provide scripts for lancets, test strips, and swabs)  •	Do not specify brand for testing supplies (such as contour, freestyle, one touch etc) that way the pharmacy has the freedom to pick and change according to what the insurance dictates.  For patients without insurance:   •	Provide social work with appropriate scripts so they may obtain 1 week of samples  •	Provide with glucometer. Glucometers are located at various nursing stations, the nursing office, education, and endocrine fellows office.  •	Please make appointment with Becca Gonzalez NP or Deisy Conway RN and BETTIE Christopher at the [address] [place] [address] endocrinology clinic. They can see patients without insurance, provide appropriate samples, and assist in getting insurance coverage.     PREFERRED PHARMACY:  [facility] (located on 1st floor next to admitting)  P: [phone]  Hours: M – F 8AM – 8PM, Sat 8AM – 4PM, Sun—closed  If not using [facility], please follow up with chosen pharmacy to ensure insulin prescribed is covered. INTERVAL HPI/OVERNIGHT EVENTS:    Patient is a 65y old  Male who presents with a chief complaint of same (2020 09:24)  Pt is planned fro discharge today. Pt was seen an examined Pt has no complaints     FSG & Insulin received:    Yesterday:  pre-dinner fs  nutritional lispro  10 units   bedtime fs  lantus 28  units +  4  units lispro SS    Today:  pre-breakfast fs  nutritional lispro 10   units+ 2   units lispro SS  pre-lunch fs  nutritional lispro 10 units+ 2 units lispro SS      Pt reports the following symptoms:    CONSTITUTIONAL:  Negative fever or chills  CARDIOVASCULAR:  Negative for chest pain or palpitations  RESPIRATORY:  Negative for cough, wheezing, or SOB   GASTROINTESTINAL:  Negative for nausea, vomiting, diarrhea, constipation, or abdominal pain  GENITOURINARY:  Negative frequency, urgency or dysuria  NEUROLOGIC:  No headache, confusion, dizziness, lightheadedness    MEDICATIONS  (STANDING):  acetaminophen   Tablet .. 975 milliGRAM(s) Oral every 8 hours  atorvastatin 10 milliGRAM(s) Oral at bedtime  dextrose 5%. 1000 milliLiter(s) (50 mL/Hr) IV Continuous <Continuous>  dextrose 5%. 1000 milliLiter(s) (50 mL/Hr) IV Continuous <Continuous>  dextrose 50% Injectable 12.5 Gram(s) IV Push once  dextrose 50% Injectable 25 Gram(s) IV Push once  dextrose 50% Injectable 25 Gram(s) IV Push once  insulin glargine Injectable (LANTUS) 28 Unit(s) SubCutaneous at bedtime  insulin lispro (HumaLOG) corrective regimen sliding scale   SubCutaneous Before meals and at bedtime  insulin lispro Injectable (HumaLOG) 10 Unit(s) SubCutaneous three times a day before meals  melatonin 5 milliGRAM(s) Oral at bedtime  polyethylene glycol 3350 17 Gram(s) Oral daily  senna 2 Tablet(s) Oral at bedtime    MEDICATIONS  (PRN):  acetaminophen   Tablet .. 975 milliGRAM(s) Oral every 8 hours PRN Temp greater or equal to 38C (100.4F), Mild Pain (1 - 3)  aluminum hydroxide/magnesium hydroxide/simethicone Suspension 30 milliLiter(s) Oral four times a day PRN Indigestion  bisacodyl Suppository 10 milliGRAM(s) Rectal daily PRN If no bowel movement by postoperative day #2  cyclobenzaprine 5 milliGRAM(s) Oral three times a day PRN Muscle Spasm  dextrose 40% Gel 15 Gram(s) Oral once PRN Blood Glucose LESS THAN 70 milliGRAM(s)/deciliter  glucagon  Injectable 1 milliGRAM(s) IntraMuscular once PRN Glucose LESS THAN 70 milligrams/deciliter  glucagon  Injectable 1 milliGRAM(s) IntraMuscular once PRN Glucose LESS THAN 70 milligrams/deciliter  HYDROmorphone  Injectable 0.5 milliGRAM(s) IV Push every 2 hours PRN breakthrough pain  magnesium hydroxide Suspension 30 milliLiter(s) Oral daily PRN Constipation  naloxone Injectable 0.1 milliGRAM(s) IV Push every 3 minutes PRN For ANY of the following changes in patient status:  A. RR LESS THAN 10 breaths per minute, B. Oxygen saturation LESS THAN 90%, C. Sedation score of 6  ondansetron Injectable 4 milliGRAM(s) IV Push every 6 hours PRN Nausea and/or Vomiting  oxyCODONE    IR 5 milliGRAM(s) Oral every 4 hours PRN Moderate Pain (4 - 6)  oxyCODONE    IR 10 milliGRAM(s) Oral every 4 hours PRN Severe Pain (7 - 10)      Past medical history reviewed  Family history reviewed  Social history reviewed    PHYSICAL EXAM  Vital Signs Last 24 Hrs  T(C): 36.7 (2020 05:24), Max: 37.1 (15 Jul 2020 15:37)  T(F): 98.1 (2020 05:24), Max: 98.8 (15 Jul 2020 15:37)  HR: 88 (2020 05:24) (72 - 94)  BP: 130/74 (2020 05:24) (119/58 - 131/70)  BP(mean): --  RR: 18 (2020 05:24) (16 - 18)  SpO2: 100% (2020 05:24) (95% - 100%)    Constitutional: wn/wd in NAD.   Neck: no thyromegaly or palpable thyroid nodules   Respiratory: lungs CTAB.  Cardiovascular: regular rhythm, normal S1 and S2, no audible murmurs, no peripheral edema  GI: soft, NT/ND, no masses/HSM appreciated.  Neurology: no tremors, DTR 2+  Psychiatric: AAO x 3, normal affect/mood.    LABS:                        11.2   8.82  )-----------( 161      ( 2020 07:08 )             32.8     07-16    138  |  100  |  13  ----------------------------<  165<H>  3.5   |  27  |  1.06    Ca    8.4      2020 07:08              HbA1C: 11.6 % ( @ 06:25)      CAPILLARY BLOOD GLUCOSE      POCT Blood Glucose.: 190 mg/dL (2020 10:34)  POCT Blood Glucose.: 159 mg/dL (2020 06:48)  POCT Blood Glucose.: 240 mg/dL (15 Jul 2020 21:24)  POCT Blood Glucose.: 125 mg/dL (15 Jul 2020 16:57)  POCT Blood Glucose.: 205 mg/dL (15 Jul 2020 12:05)      Will discuss in rounds  A/P: 65M PMH of HTN, DM, HLD R eye blindness s/p retinal detachment surgery in , partial R knee replacement 2016 was admitted for surgical treatment for spinal stenosis. He is S/P TLIF L5-S1on 2020.    1.  DM Type 2 - uncontrolled - with complications - hyperglycemia  - Hba1c 11.6  Cr/GFR 1.37/ 54  .1 kg with BMI 34.2    carb consistent diet  MEME Castro has been consulted    For discharge:  Please increase Lantus to 30   units at night.   Please increase Lispro to 12 units before each meal.      Pt's fingerstick glucose goal is 120 to 150  Pt scheduled for follow up appointment at [facility]  with SANJEEV Ojeda on friday,  at 10AM and Elliott Rizo at 11 AM.       discussed case with   and updated primary team    To Make an appointment at [address] [place] [address] for the patient, either:  1. Send a STAT task via Allsc[employer]TapFame to Annette Hough or Nkechi Cohen (office managers)   OR  2. Call supervisor's line. P: [phone] (do not give this number to patient). VM is checked periodically.  In the message, specify that this is a hospital discharge follow-up, and that the appt can be made with a NP if there is no timely MD availability.    REMINDERS FOR INSULIN/DIABETES SUPPLIES at DISCHARGE:  INSULIN:   Long actin/Basal Insulin: Examples: Toujeo, Basaglar, Tresiba, Lantus   Short acting/Bolus Insulin: Humalog, Admelog, Novolog  Please ensure that BOTH short acting and long acting insulin are prescribed in the same preparation (Ex: PEN vs VIAL/SOLUTION)     TESTING SUPPLIES:   All glucometer supplies should be written as generic to avoid issues with insurance. Use the free text option in sunrise prescription writer, and type in glucometer test strips, lancets, etc to order.    If sending patient home on insulin PEN, please send:   •	BD bertha insulin pen needles for use up to 4 times daily (total quantity 100)  •	Lancets for use up to 4 times daily (total quantity 100)  •	Glucometer Test strips for use up to 4 times daily (total quantity 100)  •	Alcohol swabs for use up to 4 times daily (total quantity 100)  •	Glucometer (If provided by hospital, still provide scripts for lancets, test strips, and swabs)  If sending patient home on insulin VIAL, please send:   •	Insulin syringes (6mm) - for use up to 4 times daily (total quantity 100)  •	Lancets for use up to 4 times daily (total quantity 100)  •	Generic Glucometer Test strips for use up to 4 times daily (total quantity 100)  •	Alcohol swabs for use up to 4 times daily (total quantity 100)  •	Generic Glucometer (If provided by hospital, still provide scripts for lancets, test strips, and swabs)  •	Do not specify brand for testing supplies (such as contour, freestyle, one touch etc) that way the pharmacy has the freedom to pick and change according to what the insurance dictates.  For patients without insurance:   •	Provide social work with appropriate scripts so they may obtain 1 week of samples  •	Provide with glucometer. Glucometers are located at various nursing stations, the nursing office, education, and endocrine fellows office.  •	Please make appointment with Becca Gonzalez NP or Deisy Conway RN and BETTIE Christopher at the [address] [place] [address] endocrinology clinic. They can see patients without insurance, provide appropriate samples, and assist in getting insurance coverage.     PREFERRED PHARMACY:  [facility] (located on 1st floor next to admitting)  P: [phone]  Hours: M – F 8AM – 8PM, Sat 8AM – 4PM, Sun—closed  If not using [facility], please follow up with chosen pharmacy to ensure insulin prescribed is covered. INTERVAL HPI/OVERNIGHT EVENTS:    Patient is a 65y old  Male who presents with a chief complaint of same (2020 09:24)  Pt is planned fro discharge today. Pt was seen an examined Pt complins of knee pain.     FSG & Insulin received:    Yesterday:  pre-dinner fs  nutritional lispro  10 units   bedtime fs  lantus 28  units +  4  units lispro SS    Today:  pre-breakfast fs  nutritional lispro 10   units+ 2   units lispro SS  pre-lunch fs  nutritional lispro 10 units+ 2 units lispro SS      Pt reports the following symptoms:    CONSTITUTIONAL:  Negative fever or chills  CARDIOVASCULAR:  Negative for chest pain or palpitations  RESPIRATORY:  Negative for cough, wheezing, or SOB   GASTROINTESTINAL:  Negative for nausea, vomiting, diarrhea, constipation, or abdominal pain  GENITOURINARY:  Negative frequency, urgency or dysuria  NEUROLOGIC:  No headache, confusion, dizziness, lightheadedness    MEDICATIONS  (STANDING):  acetaminophen   Tablet .. 975 milliGRAM(s) Oral every 8 hours  atorvastatin 10 milliGRAM(s) Oral at bedtime  dextrose 5%. 1000 milliLiter(s) (50 mL/Hr) IV Continuous <Continuous>  dextrose 5%. 1000 milliLiter(s) (50 mL/Hr) IV Continuous <Continuous>  dextrose 50% Injectable 12.5 Gram(s) IV Push once  dextrose 50% Injectable 25 Gram(s) IV Push once  dextrose 50% Injectable 25 Gram(s) IV Push once  insulin glargine Injectable (LANTUS) 28 Unit(s) SubCutaneous at bedtime  insulin lispro (HumaLOG) corrective regimen sliding scale   SubCutaneous Before meals and at bedtime  insulin lispro Injectable (HumaLOG) 10 Unit(s) SubCutaneous three times a day before meals  melatonin 5 milliGRAM(s) Oral at bedtime  polyethylene glycol 3350 17 Gram(s) Oral daily  senna 2 Tablet(s) Oral at bedtime    MEDICATIONS  (PRN):  acetaminophen   Tablet .. 975 milliGRAM(s) Oral every 8 hours PRN Temp greater or equal to 38C (100.4F), Mild Pain (1 - 3)  aluminum hydroxide/magnesium hydroxide/simethicone Suspension 30 milliLiter(s) Oral four times a day PRN Indigestion  bisacodyl Suppository 10 milliGRAM(s) Rectal daily PRN If no bowel movement by postoperative day #2  cyclobenzaprine 5 milliGRAM(s) Oral three times a day PRN Muscle Spasm  dextrose 40% Gel 15 Gram(s) Oral once PRN Blood Glucose LESS THAN 70 milliGRAM(s)/deciliter  glucagon  Injectable 1 milliGRAM(s) IntraMuscular once PRN Glucose LESS THAN 70 milligrams/deciliter  glucagon  Injectable 1 milliGRAM(s) IntraMuscular once PRN Glucose LESS THAN 70 milligrams/deciliter  HYDROmorphone  Injectable 0.5 milliGRAM(s) IV Push every 2 hours PRN breakthrough pain  magnesium hydroxide Suspension 30 milliLiter(s) Oral daily PRN Constipation  naloxone Injectable 0.1 milliGRAM(s) IV Push every 3 minutes PRN For ANY of the following changes in patient status:  A. RR LESS THAN 10 breaths per minute, B. Oxygen saturation LESS THAN 90%, C. Sedation score of 6  ondansetron Injectable 4 milliGRAM(s) IV Push every 6 hours PRN Nausea and/or Vomiting  oxyCODONE    IR 5 milliGRAM(s) Oral every 4 hours PRN Moderate Pain (4 - 6)  oxyCODONE    IR 10 milliGRAM(s) Oral every 4 hours PRN Severe Pain (7 - 10)      Past medical history reviewed  Family history reviewed  Social history reviewed    PHYSICAL EXAM  Vital Signs Last 24 Hrs  T(C): 36.7 (2020 05:24), Max: 37.1 (15 Jul 2020 15:37)  T(F): 98.1 (2020 05:24), Max: 98.8 (15 Jul 2020 15:37)  HR: 88 (2020 05:24) (72 - 94)  BP: 130/74 (2020 05:24) (119/58 - 131/70)  BP(mean): --  RR: 18 (2020 05:24) (16 - 18)  SpO2: 100% (2020 05:24) (95% - 100%)    Constitutional: wn/wd in NAD.   Neck: no thyromegaly or palpable thyroid nodules   Respiratory: lungs CTAB.  Cardiovascular: regular rhythm, normal S1 and S2, no audible murmurs, no peripheral edema  GI: soft, NT/ND, no masses/HSM appreciated.  Neurology: no tremors, DTR 2+  Psychiatric: AAO x 3, normal affect/mood.    LABS:                        11.2   8.82  )-----------( 161      ( 2020 07:08 )             32.8     07-16    138  |  100  |  13  ----------------------------<  165<H>  3.5   |  27  |  1.06    Ca    8.4      2020 07:08              HbA1C: 11.6 % ( @ 06:25)      CAPILLARY BLOOD GLUCOSE      POCT Blood Glucose.: 190 mg/dL (2020 10:34)  POCT Blood Glucose.: 159 mg/dL (2020 06:48)  POCT Blood Glucose.: 240 mg/dL (15 Jul 2020 21:24)  POCT Blood Glucose.: 125 mg/dL (15 Jul 2020 16:57)  POCT Blood Glucose.: 205 mg/dL (15 Jul 2020 12:05)      Will discuss in rounds  A/P: 65M PMH of HTN, DM, HLD R eye blindness s/p retinal detachment surgery in , partial R knee replacement 2016 was admitted for surgical treatment for spinal stenosis. He is S/P TLIF L5-S1on 2020.    1.  DM Type 2 - uncontrolled - with complications - hyperglycemia  - Hba1c 11.6  Cr/GFR 1.37/ 54  .1 kg with BMI 34.2    carb consistent diet  MEME Castro has been consulted    For discharge:  Please increase Lantus to 30   units at night.   Please increase Lispro to 12 units before each meal.      Pt's fingerstick glucose goal is 120 to 150  Pt scheduled for follow up appointment at [facility]  with SANJEEV Ojeda on friday,  at 10AM and Elliott Rizo at 11 AM.       discussed case with   and updated primary team    To Make an appointment at [address] [place] [address] for the patient, either:  1. Send a STAT task via [employer] to Annette Hough or Nkechi Cohen (office managers)   OR  2. Call supervisor's line. P: [phone] (do not give this number to patient). VM is checked periodically.  In the message, specify that this is a hospital discharge follow-up, and that the appt can be made with a NP if there is no timely MD availability.    REMINDERS FOR INSULIN/DIABETES SUPPLIES at DISCHARGE:  INSULIN:   Long actin/Basal Insulin: Examples: Toujeo, Basaglar, Tresiba, Lantus   Short acting/Bolus Insulin: Humalog, Admelog, Novolog  Please ensure that BOTH short acting and long acting insulin are prescribed in the same preparation (Ex: PEN vs VIAL/SOLUTION)     TESTING SUPPLIES:   All glucometer supplies should be written as generic to avoid issues with insurance. Use the free text option in sunrise prescription writer, and type in glucometer test strips, lancets, etc to order.    If sending patient home on insulin PEN, please send:   •	BD bertha insulin pen needles for use up to 4 times daily (total quantity 100)  •	Lancets for use up to 4 times daily (total quantity 100)  •	Glucometer Test strips for use up to 4 times daily (total quantity 100)  •	Alcohol swabs for use up to 4 times daily (total quantity 100)  •	Glucometer (If provided by hospital, still provide scripts for lancets, test strips, and swabs)  If sending patient home on insulin VIAL, please send:   •	Insulin syringes (6mm) - for use up to 4 times daily (total quantity 100)  •	Lancets for use up to 4 times daily (total quantity 100)  •	Generic Glucometer Test strips for use up to 4 times daily (total quantity 100)  •	Alcohol swabs for use up to 4 times daily (total quantity 100)  •	Generic Glucometer (If provided by hospital, still provide scripts for lancets, test strips, and swabs)  •	Do not specify brand for testing supplies (such as contour, freestyle, one touch etc) that way the pharmacy has the freedom to pick and change according to what the insurance dictates.  For patients without insurance:   •	Provide social work with appropriate scripts so they may obtain 1 week of samples  •	Provide with glucometer. Glucometers are located at various nursing stations, the nursing office, education, and endocrine fellows office.  •	Please make appointment with Becca Gonzalez NP or Deisy Conway RN and BETTIE Christopher at the [address] [place] [address] endocrinology clinic. They can see patients without insurance, provide appropriate samples, and assist in getting insurance coverage.     PREFERRED PHARMACY:  [facility] (located on 1st floor next to admitting)  P: [phone]  Hours: M – F 8AM – 8PM, Sat 8AM – 4PM, Sun—closed  If not using [facility], please follow up with chosen pharmacy to ensure insulin prescribed is covered. INTERVAL HPI/OVERNIGHT EVENTS:    Patient is a 65y old  Male who presents with a chief complaint of same (2020 09:24)  Pt is planned fro discharge today. Pt was seen an examined Pt complins of knee pain, which improved with pain medication.     FSG & Insulin received:    Yesterday:  pre-dinner fs  nutritional lispro  10 units   bedtime fs  lantus 28  units +  4  units lispro SS    Today:  pre-breakfast fs  nutritional lispro 10   units+ 2   units lispro SS  pre-lunch fs  nutritional lispro 10 units+ 2 units lispro SS      Pt reports the following symptoms:    CONSTITUTIONAL:  Negative fever or chills  CARDIOVASCULAR:  Negative for chest pain or palpitations  RESPIRATORY:  Negative for cough, wheezing, or SOB   GASTROINTESTINAL:  Negative for nausea, vomiting, diarrhea, constipation, or abdominal pain  GENITOURINARY:  Negative frequency, urgency or dysuria  NEUROLOGIC:  No headache, confusion, dizziness, lightheadedness    MEDICATIONS  (STANDING):  acetaminophen   Tablet .. 975 milliGRAM(s) Oral every 8 hours  atorvastatin 10 milliGRAM(s) Oral at bedtime  dextrose 5%. 1000 milliLiter(s) (50 mL/Hr) IV Continuous <Continuous>  dextrose 5%. 1000 milliLiter(s) (50 mL/Hr) IV Continuous <Continuous>  dextrose 50% Injectable 12.5 Gram(s) IV Push once  dextrose 50% Injectable 25 Gram(s) IV Push once  dextrose 50% Injectable 25 Gram(s) IV Push once  insulin glargine Injectable (LANTUS) 28 Unit(s) SubCutaneous at bedtime  insulin lispro (HumaLOG) corrective regimen sliding scale   SubCutaneous Before meals and at bedtime  insulin lispro Injectable (HumaLOG) 10 Unit(s) SubCutaneous three times a day before meals  melatonin 5 milliGRAM(s) Oral at bedtime  polyethylene glycol 3350 17 Gram(s) Oral daily  senna 2 Tablet(s) Oral at bedtime    MEDICATIONS  (PRN):  acetaminophen   Tablet .. 975 milliGRAM(s) Oral every 8 hours PRN Temp greater or equal to 38C (100.4F), Mild Pain (1 - 3)  aluminum hydroxide/magnesium hydroxide/simethicone Suspension 30 milliLiter(s) Oral four times a day PRN Indigestion  bisacodyl Suppository 10 milliGRAM(s) Rectal daily PRN If no bowel movement by postoperative day #2  cyclobenzaprine 5 milliGRAM(s) Oral three times a day PRN Muscle Spasm  dextrose 40% Gel 15 Gram(s) Oral once PRN Blood Glucose LESS THAN 70 milliGRAM(s)/deciliter  glucagon  Injectable 1 milliGRAM(s) IntraMuscular once PRN Glucose LESS THAN 70 milligrams/deciliter  glucagon  Injectable 1 milliGRAM(s) IntraMuscular once PRN Glucose LESS THAN 70 milligrams/deciliter  HYDROmorphone  Injectable 0.5 milliGRAM(s) IV Push every 2 hours PRN breakthrough pain  magnesium hydroxide Suspension 30 milliLiter(s) Oral daily PRN Constipation  naloxone Injectable 0.1 milliGRAM(s) IV Push every 3 minutes PRN For ANY of the following changes in patient status:  A. RR LESS THAN 10 breaths per minute, B. Oxygen saturation LESS THAN 90%, C. Sedation score of 6  ondansetron Injectable 4 milliGRAM(s) IV Push every 6 hours PRN Nausea and/or Vomiting  oxyCODONE    IR 5 milliGRAM(s) Oral every 4 hours PRN Moderate Pain (4 - 6)  oxyCODONE    IR 10 milliGRAM(s) Oral every 4 hours PRN Severe Pain (7 - 10)      Past medical history reviewed  Family history reviewed  Social history reviewed    PHYSICAL EXAM  Vital Signs Last 24 Hrs  T(C): 36.7 (2020 05:24), Max: 37.1 (15 Jul 2020 15:37)  T(F): 98.1 (2020 05:24), Max: 98.8 (15 Jul 2020 15:37)  HR: 88 (2020 05:24) (72 - 94)  BP: 130/74 (2020 05:24) (119/58 - 131/70)  BP(mean): --  RR: 18 (2020 05:24) (16 - 18)  SpO2: 100% (2020 05:24) (95% - 100%)    Constitutional: wn/wd in NAD.   Neck: no thyromegaly or palpable thyroid nodules   Respiratory: lungs CTAB.  Cardiovascular: regular rhythm, normal S1 and S2, no audible murmurs, no peripheral edema  GI: soft, NT/ND, no masses/HSM appreciated.  Neurology: no tremors, DTR 2+  Psychiatric: AAO x 3, normal affect/mood.    LABS:                        11.2   8.82  )-----------( 161      ( 2020 07:08 )             32.8     07-16    138  |  100  |  13  ----------------------------<  165<H>  3.5   |  27  |  1.06    Ca    8.4      2020 07:08              HbA1C: 11.6 % ( @ 06:25)      CAPILLARY BLOOD GLUCOSE      POCT Blood Glucose.: 190 mg/dL (2020 10:34)  POCT Blood Glucose.: 159 mg/dL (2020 06:48)  POCT Blood Glucose.: 240 mg/dL (15 Jul 2020 21:24)  POCT Blood Glucose.: 125 mg/dL (15 Jul 2020 16:57)  POCT Blood Glucose.: 205 mg/dL (15 Jul 2020 12:05)      Will discuss in rounds  A/P: 65M PMH of HTN, DM, HLD R eye blindness s/p retinal detachment surgery in , partial R knee replacement 2016 was admitted for surgical treatment for spinal stenosis. He is S/P TLIF L5-S1on 2020.    1.  DM Type 2 - uncontrolled - with complications - hyperglycemia  - Hba1c 11.6  Cr/GFR 1.37/ 54  .1 kg with BMI 34.2    carb consistent diet  MEME Castro has been consulted    For discharge:  Please increase Lantus to 30   units at night.   Please increase Lispro to 12 units before each meal.      Pt's fingerstick glucose goal is 120 to 150  Pt scheduled for follow up appointment at [facility]  with SANJEEV Ojeda on  at 10AM and Elliott Rizo at 11 AM.       case discussed and seen with   and updated primary team    To Make an appointment at [address] [place] [address] for the patient, either:  1. Send a STAT task via [employer] to Annette Hough or Nkechi Cohen (office managers)   OR  2. Call supervisor's line. P: [phone] (do not give this number to patient). VM is checked periodically.  In the message, specify that this is a hospital discharge follow-up, and that the appt can be made with a NP if there is no timely MD availability.    REMINDERS FOR INSULIN/DIABETES SUPPLIES at DISCHARGE:  INSULIN:   Long actin/Basal Insulin: Examples: Toujeo, Basaglar, Tresiba, Lantus   Short acting/Bolus Insulin: Humalog, Admelog, Novolog  Please ensure that BOTH short acting and long acting insulin are prescribed in the same preparation (Ex: PEN vs VIAL/SOLUTION)     TESTING SUPPLIES:   All glucometer supplies should be written as generic to avoid issues with insurance. Use the free text option in sunrise prescription writer, and type in glucometer test strips, lancets, etc to order.    If sending patient home on insulin PEN, please send:   •	BD bertha insulin pen needles for use up to 4 times daily (total quantity 100)  •	Lancets for use up to 4 times daily (total quantity 100)  •	Glucometer Test strips for use up to 4 times daily (total quantity 100)  •	Alcohol swabs for use up to 4 times daily (total quantity 100)  •	Glucometer (If provided by hospital, still provide scripts for lancets, test strips, and swabs)  If sending patient home on insulin VIAL, please send:   •	Insulin syringes (6mm) - for use up to 4 times daily (total quantity 100)  •	Lancets for use up to 4 times daily (total quantity 100)  •	Generic Glucometer Test strips for use up to 4 times daily (total quantity 100)  •	Alcohol swabs for use up to 4 times daily (total quantity 100)  •	Generic Glucometer (If provided by hospital, still provide scripts for lancets, test strips, and swabs)  •	Do not specify brand for testing supplies (such as contour, freestyle, one touch etc) that way the pharmacy has the freedom to pick and change according to what the insurance dictates.  For patients without insurance:   •	Provide social work with appropriate scripts so they may obtain 1 week of samples  •	Provide with glucometer. Glucometers are located at various nursing stations, the nursing office, education, and endocrine fellows office.  •	Please make appointment with Becca Gonzalez NP or Deisy Conway RN and BETTIE Christopher at the [address] [place] [address] endocrinology clinic. They can see patients without insurance, provide appropriate samples, and assist in getting insurance coverage.     PREFERRED PHARMACY:  [facility] (located on 1st floor next to admitting)  P: [phone]  Hours: M – F 8AM – 8PM, Sat 8AM – 4PM, Sun—closed  If not using [facility], please follow up with chosen pharmacy to ensure insulin prescribed is covered. INTERVAL HPI/OVERNIGHT EVENTS:    Patient is a 65y old  Male who presents with a chief complaint of same (2020 09:24)  Pt is planned fro discharge today. Pt was seen an examined Pt complins of knee pain, which improved with pain medication.     FSG & Insulin received:    Yesterday:  pre-dinner fs (pt and salmon and rice for dinner)  nutritional lispro  10 units   bedtime fs  lantus 28  units +  4  units lispro SS    Today:  pre-breakfast fs(pt had creamy wheat, fruit and coffee for breakfast)  nutritional lispro 10   units+ 2   units lispro SS  pre-lunch fs  nutritional lispro 10 units+ 2 units lispro SS      Pt reports the following symptoms:    CONSTITUTIONAL:  Negative fever or chills  CARDIOVASCULAR:  Negative for chest pain or palpitations  RESPIRATORY:  Negative for cough, wheezing, or SOB   GASTROINTESTINAL:  Negative for nausea, vomiting, diarrhea, constipation, or abdominal pain  GENITOURINARY:  Negative frequency, urgency or dysuria  NEUROLOGIC:  No headache, confusion, dizziness, lightheadedness    MEDICATIONS  (STANDING):  acetaminophen   Tablet .. 975 milliGRAM(s) Oral every 8 hours  atorvastatin 10 milliGRAM(s) Oral at bedtime  dextrose 5%. 1000 milliLiter(s) (50 mL/Hr) IV Continuous <Continuous>  dextrose 5%. 1000 milliLiter(s) (50 mL/Hr) IV Continuous <Continuous>  dextrose 50% Injectable 12.5 Gram(s) IV Push once  dextrose 50% Injectable 25 Gram(s) IV Push once  dextrose 50% Injectable 25 Gram(s) IV Push once  insulin glargine Injectable (LANTUS) 28 Unit(s) SubCutaneous at bedtime  insulin lispro (HumaLOG) corrective regimen sliding scale   SubCutaneous Before meals and at bedtime  insulin lispro Injectable (HumaLOG) 10 Unit(s) SubCutaneous three times a day before meals  melatonin 5 milliGRAM(s) Oral at bedtime  polyethylene glycol 3350 17 Gram(s) Oral daily  senna 2 Tablet(s) Oral at bedtime    MEDICATIONS  (PRN):  acetaminophen   Tablet .. 975 milliGRAM(s) Oral every 8 hours PRN Temp greater or equal to 38C (100.4F), Mild Pain (1 - 3)  aluminum hydroxide/magnesium hydroxide/simethicone Suspension 30 milliLiter(s) Oral four times a day PRN Indigestion  bisacodyl Suppository 10 milliGRAM(s) Rectal daily PRN If no bowel movement by postoperative day #2  cyclobenzaprine 5 milliGRAM(s) Oral three times a day PRN Muscle Spasm  dextrose 40% Gel 15 Gram(s) Oral once PRN Blood Glucose LESS THAN 70 milliGRAM(s)/deciliter  glucagon  Injectable 1 milliGRAM(s) IntraMuscular once PRN Glucose LESS THAN 70 milligrams/deciliter  glucagon  Injectable 1 milliGRAM(s) IntraMuscular once PRN Glucose LESS THAN 70 milligrams/deciliter  HYDROmorphone  Injectable 0.5 milliGRAM(s) IV Push every 2 hours PRN breakthrough pain  magnesium hydroxide Suspension 30 milliLiter(s) Oral daily PRN Constipation  naloxone Injectable 0.1 milliGRAM(s) IV Push every 3 minutes PRN For ANY of the following changes in patient status:  A. RR LESS THAN 10 breaths per minute, B. Oxygen saturation LESS THAN 90%, C. Sedation score of 6  ondansetron Injectable 4 milliGRAM(s) IV Push every 6 hours PRN Nausea and/or Vomiting  oxyCODONE    IR 5 milliGRAM(s) Oral every 4 hours PRN Moderate Pain (4 - 6)  oxyCODONE    IR 10 milliGRAM(s) Oral every 4 hours PRN Severe Pain (7 - 10)      Past medical history reviewed  Family history reviewed  Social history reviewed    PHYSICAL EXAM  Vital Signs Last 24 Hrs  T(C): 36.7 (2020 05:24), Max: 37.1 (15 Jul 2020 15:37)  T(F): 98.1 (2020 05:24), Max: 98.8 (15 Jul 2020 15:37)  HR: 88 (2020 05:24) (72 - 94)  BP: 130/74 (2020 05:24) (119/58 - 131/70)  BP(mean): --  RR: 18 (2020 05:24) (16 - 18)  SpO2: 100% (2020 05:24) (95% - 100%)    Constitutional: wn/wd in NAD.   Neck: no thyromegaly or palpable thyroid nodules   Respiratory: lungs CTAB.  Cardiovascular: regular rhythm, normal S1 and S2, no audible murmurs, no peripheral edema  GI: soft, NT/ND, no masses/HSM appreciated.  Neurology: no tremors, DTR 2+  Psychiatric: AAO x 3, normal affect/mood.    LABS:                        11.2   8.82  )-----------( 161      ( 2020 07:08 )             32.8     07-16    138  |  100  |  13  ----------------------------<  165<H>  3.5   |  27  |  1.06    Ca    8.4      2020 07:08              HbA1C: 11.6 % ( @ 06:25)      CAPILLARY BLOOD GLUCOSE      POCT Blood Glucose.: 190 mg/dL (2020 10:34)  POCT Blood Glucose.: 159 mg/dL (2020 06:48)  POCT Blood Glucose.: 240 mg/dL (15 Jul 2020 21:24)  POCT Blood Glucose.: 125 mg/dL (15 Jul 2020 16:57)  POCT Blood Glucose.: 205 mg/dL (15 Jul 2020 12:05)      Will discuss in rounds  A/P: 65M PMH of HTN, DM, HLD R eye blindness s/p retinal detachment surgery in , partial R knee replacement 2016 was admitted for surgical treatment for spinal stenosis. He is S/P TLIF L5-S1on 2020.    1.  DM Type 2 - uncontrolled - with complications - hyperglycemia  - Hba1c 11.6  Cr/GFR 1.37/ 54  .1 kg with BMI 34.2    carb consistent diet  MEME Castro has been consulted    For discharge:  Please increase Lantus to 30   units at night.   Please increase Lispro to 12 units before each meal.      Pt's fingerstick glucose goal is 120 to 150  Pt scheduled for follow up appointment at [facility]  with SANJEEV Ojeda on  at 10AM and Elloitt Rizo at 11 AM.       case discussed and seen with   and updated primary team    To Make an appointment at [address] [place] [address] for the patient, either:  1. Send a STAT task via [employer] to Annette Hough or Nkechi Cohen (office managers)   OR  2. Call supervisor's line. P: [phone] (do not give this number to patient). VM is checked periodically.  In the message, specify that this is a hospital discharge follow-up, and that the appt can be made with a NP if there is no timely MD availability.    REMINDERS FOR INSULIN/DIABETES SUPPLIES at DISCHARGE:  INSULIN:   Long actin/Basal Insulin: Examples: Toujeo, Basaglar, Tresiba, Lantus   Short acting/Bolus Insulin: Humalog, Admelog, Novolog  Please ensure that BOTH short acting and long acting insulin are prescribed in the same preparation (Ex: PEN vs VIAL/SOLUTION)     TESTING SUPPLIES:   All glucometer supplies should be written as generic to avoid issues with insurance. Use the free text option in sunrise prescription writer, and type in glucometer test strips, lancets, etc to order.    If sending patient home on insulin PEN, please send:   •	BD bertha insulin pen needles for use up to 4 times daily (total quantity 100)  •	Lancets for use up to 4 times daily (total quantity 100)  •	Glucometer Test strips for use up to 4 times daily (total quantity 100)  •	Alcohol swabs for use up to 4 times daily (total quantity 100)  •	Glucometer (If provided by hospital, still provide scripts for lancets, test strips, and swabs)  If sending patient home on insulin VIAL, please send:   •	Insulin syringes (6mm) - for use up to 4 times daily (total quantity 100)  •	Lancets for use up to 4 times daily (total quantity 100)  •	Generic Glucometer Test strips for use up to 4 times daily (total quantity 100)  •	Alcohol swabs for use up to 4 times daily (total quantity 100)  •	Generic Glucometer (If provided by hospital, still provide scripts for lancets, test strips, and swabs)  •	Do not specify brand for testing supplies (such as contour, freestyle, one touch etc) that way the pharmacy has the freedom to pick and change according to what the insurance dictates.  For patients without insurance:   •	Provide social work with appropriate scripts so they may obtain 1 week of samples  •	Provide with glucometer. Glucometers are located at various nursing stations, the nursing office, education, and endocrine fellows office.  •	Please make appointment with Becca Gonzalez NP or Deisy Conway RN and BETTIE Christopher at the [address] [place] [address] endocrinology clinic. They can see patients without insurance, provide appropriate samples, and assist in getting insurance coverage.     PREFERRED PHARMACY:  [facility] (located on 1st floor next to admitting)  P: [phone]  Hours: M – F 8AM – 8PM, Sat 8AM – 4PM, Sun—closed  If not using [facility], please follow up with chosen pharmacy to ensure insulin prescribed is covered.

## 2020-07-16 NOTE — PROGRESS NOTE ADULT - ASSESSMENT
Spoke with patient preoperatively and he is aware I will be assisting during today's surgery and disclosure performed.
65M w h/o Spinal stenosis, DM (a1C 11.8), HTN, HLD, Obesity here w worsening back pain and lumbar radiculopathy now s/p L4-L5 Lami/Fusion w Dr. Schmitz 7/13      #Post-op state - pain controlled. On bowel regimen. PPx: SCDs. IS at bedside. DISPO: Pending PT  #Spinal stenosis. sxs appear to be improved. Mgmt per orthopedics  #DM - above target 230 (AM) to 280. On sliding scale. a1C 11.8  #HTN - holding home lisinopril  #HLD - stable. chronic. home atorva 10  #CKDIII - appears chronic. Cr decreased to 1.1 yesterday but now back to 1.3 (1.4 on admission). Would   #Obesity - 34 - outpt f/u    Plan  --F/U PT eval  --Endocrine consultation; f/u recs  --Increase sliding scale to 2/4/6/8 from 1/2/3/4  --Start lantus 30u HS tonight    Dispo: pending PT evaluation
65M w h/o Spinal stenosis, DM (a1C 11.8), HTN, HLD, Obesity here w worsening back pain and lumbar radiculopathy now s/p L4-L5 Lami/Fusion w Dr. Schmitz 7/13    #Post-op state - pain controlled. On bowel regimen. PPx: SCDs. IS at bedside. DISPO: Home  #Spinal stenosis. sxs appear to be improved. Mgmt per orthopedics  #DM - Improved. 120-240 On lantus 30 w 12 AC.  Endocrine following. a1C 11.8  #HTN - At target. Would restart home lisinopril 20mg in setting of DM for renal protection  #HLD - stable. chronic. home atorva 10  #CKDIII - appears chronic. Cr returned to 1.06. (1.4 on admission)  #Obesity - 34 - outpt f/u    Plan  --From medical standpoint, patient optimized for discharge to home today  --Pt will follow-up with [facility] End[facility]ology and basal/bolus; has BG meter/lancet/strips at home. Interested in patch to help measure BGs in the future as he is tired of fingerpricks. Appreciate Endocrine recommendations  --Restart home lisinopril 20mg for renal protection
65M w h/o Spinal stenosis, DM (a1C 11.8), HTN, HLD, Obesity here w worsening back pain and lumbar radiculopathy now s/p L4-L5 Lami/Fusion w Dr. Schmitz 7/13    #Post-op state - pain controlled. On bowel regimen. PPx: SCDs. IS at bedside. DISPO: Home  #Spinal stenosis. sxs appear to be improved. Mgmt per orthopedics  #DM - Improved. 209-250. On lantus 20 w 3 short acting pre-meal w sliding scale. Endocrine following. a1C 11.8  #HTN - At target. Would restart home lisinopril  #HLD - stable. chronic. home atorva 10  #CKDIII - appears chronic. Cr returned to 1.1. (1.4 on admission)  #Obesity - 34 - outpt f/u    Plan  --From medical standpoint, patient optimized for discharge to home today  --He is amenable to follow-up with [facility] Endocrinology and basal/bolus; has BG meter/lancet/strips at home. Interested in patch to help measure BGs in the future as he is tired of fingerpricks  --Restart home lisinopril

## 2020-07-16 NOTE — PROGRESS NOTE ADULT - ATTENDING COMMENTS
Pt seen on rounds this afternoon.  Was also seen by the Diabetes Educator and entered into the [place] study.  Pain has decreased, and he is no longer complaining of nausea.  Glucoses elevated to the low 200 range, partly because his PO intake improved back to normal more rapidly than expected.  His basal insulin requirements are also clearly higher.  To increase the Lantus to 28 units, the pre-meal to 10 units  Discussed the principles of basal/bolus insulin regimens and also discussed his diet in some detail.
Pt seen on rounds this afternoon prior to discharge.  Glucoses improved, most values now in the 120-200 range.  Will increase the Lantus slightly to 30 units (AM ) and the lispro to 12 units premeal.  Discussed trying to divert his nighttime snacking to alternatives such as sugar-free jello, etc  Will see for follow-up at [address] [place] [address] next week

## 2020-07-16 NOTE — DISCHARGE NOTE NURSING/CASE MANAGEMENT/SOCIAL WORK - PATIENT PORTAL LINK FT
You can access the FollowMyHealth Patient Portal offered by [facility] by registering at the following website: [email or web address]. By joining [facility]’s FollowMyHealth portal, you will also be able to view your health information using other applications (apps) compatible with our system.

## 2020-07-16 NOTE — PROGRESS NOTE ADULT - REASON FOR ADMISSION
Lumbar radiculopathy
same
sciatica with foot drop
spinal stenosis/spondylolisthesis/progressive lower extremity weakness
Lumbar radiculopathy

## 2020-07-16 NOTE — PROGRESS NOTE ADULT - SUBJECTIVE AND OBJECTIVE BOX
Pain Management Progress Note - [facility] [phone]        HPI: Patient seen and examined today. Patient with a history of diabetes, hypertension, spinal stenosis, HLD, retinal detachment, CHAMP, left foot drop, s/p TLIF L5-S1 POD#3. Patient reports lower back pain and surgical site pain worse with activity, pain managed overnight with Dilaudid PCA. Patient Axox3, no s/s of oversedation. Dressing c,d,i, x1 drain present.         Pain is __x_ sharp ____dull ___burning x___achy ___ Intensity: ____ mild __x_mod _x__severe     Location __x__surgical site ____cervical __x___lumbar ____abd ____upper ext____lower ext    Worse with ___x_activity _x___movement _____physical therapy___ Rest    Improved with _x___medication _x___rest ____physical therapy      lactated ringers.  acetaminophen   Tablet ..  oxyCODONE    IR  oxyCODONE    IR  HYDROmorphone  Injectable  aluminum hydroxide/magnesium hydroxide/simethicone Suspension  ondansetron Injectable  magnesium hydroxide Suspension  polyethylene glycol 3350  bisacodyl Suppository  insulin lispro (HumaLOG) corrective regimen sliding scale  dextrose 5%.  dextrose 40% Gel  dextrose 50% Injectable  glucagon  Injectable  lisinopril  atorvastatin  melatonin  povidone iodine 5% Nasal Swab  chlorhexidine 2% Cloths  hydrALAZINE  HYDROmorphone PCA (5 mG/mL) Rescue Clinician Bolus  HYDROmorphone PCA (1 mG/mL)  naloxone Injectable  ceFAZolin   IVPB  senna  cyclobenzaprine  acetaminophen   Tablet ..  insulin glargine Injectable (LANTUS)  insulin lispro Injectable (HumaLOG)  insulin lispro (HumaLOG) corrective regimen sliding scale  dextrose 5%.  dextrose 40% Gel  dextrose 50% Injectable  dextrose 50% Injectable  glucagon  Injectable  oxyCODONE    IR  oxyCODONE    IR  HYDROmorphone  Injectable  insulin glargine Injectable (LANTUS)  insulin lispro Injectable (HumaLOG)  insulin glargine Injectable (LANTUS)  insulin lispro Injectable (HumaLOG)      ROS: Const:  _-__febrile   Eyes:___ENT:___CV: _-__chest pain  Resp: _-___sob  GI:__-_nausea _x__vomiting -___abd pain ___npo ___clears x__full diet __bm  :___ Musk: _x__pain _-__spasm  Skin:___ Neuro:  _-__koaiibnv__-_gfghprecv__-_ numbness _-__weakness __-_paresth  Psych:_-_anxiety  Endo:___ Heme:___Allergy:_________, _x__all others reviewed and negative      PAST MEDICAL & SURGICAL HISTORY:  Diabetes  Hypertension  No significant past surgical history      07-16 @ 07:0874 mL/min/1.73M2        Hemoglobin: 11.2 g/dL (07-16 @ 07:08)  Hemoglobin: 11.7 g/dL (07-15 @ 06:34)        T(C): 36.7 (07-16-20 @ 05:24), Max: 37.1 (07-15-20 @ 15:37)  HR: 88 (07-16-20 @ 05:24) (72 - 94)  BP: 130/74 (07-16-20 @ 05:24) (119/58 - 131/70)  RR: 18 (07-16-20 @ 05:24) (16 - 18)  SpO2: 100% (07-16-20 @ 05:24) (95% - 100%)  Wt(kg): --        PHYSICAL EXAM:  Gen Appearance: _x__no acute distress _x__appropriate        Neuro: _x__SILT feet____ EOM Intact Psych: AAOX_3_, _x__mood/affect appropriate        Eyes: _x__conjunctiva WNL  __x___ Pupils equal and round        ENT: __x_ears and nose atraumatic_x__ Hearing grossly intact        Neck: _x__trachea midline, no visible masses ___thyroid without palpable mass    Resp: _x__Nml WOB____No tactile fremitus ___clear to auscultation    Cardio: _x__extremities free from edema _x___pedal pulses palpable    GI/Abdomen: _x__soft __x___ Nontender____x__Nondistended_____HSM    Lymphatic: ___no palpable nodes in neck  ___no palpable nodes calves and feet    Skin/Wound: ___Incision, __x_Dressing c/d/i,   ____surrounding tissues soft,  _x__drain/chest tube present____    Muscular: EHL 5___/5  Gastrocnemius_5__/5    ___absent clubbing/cyanosis        ASSESSMENT: This is a 65y old Male with a history of diabetes, hypertension, spinal stenosis, HLD, retinal detachment, CHAMP, left foot drop, s/p TLIF L5-S1 POD#3, pain managed with current pain medication regimen.       Recommended Treatment PLAN:  1. Oxycodone 5-10mg Po Q4h prn moderate to severe pain  2. Dilaudid 0.5mg Q2h IVP prn breakthrough pain  3. Flexeril 5mg Po Q8h prn muscle spasms  4. tylenol 975mg PO Q8h standing  Plan discussed with Dr. Taylor

## 2020-07-17 PROCEDURE — 36415 COLL VENOUS BLD VENIPUNCTURE: CPT

## 2020-07-17 PROCEDURE — 85730 THROMBOPLASTIN TIME PARTIAL: CPT

## 2020-07-17 PROCEDURE — 82962 GLUCOSE BLOOD TEST: CPT

## 2020-07-17 PROCEDURE — 86803 HEPATITIS C AB TEST: CPT

## 2020-07-17 PROCEDURE — 81003 URINALYSIS AUTO W/O SCOPE: CPT

## 2020-07-17 PROCEDURE — 99285 EMERGENCY DEPT VISIT HI MDM: CPT | Mod: 25

## 2020-07-17 PROCEDURE — 87086 URINE CULTURE/COLONY COUNT: CPT

## 2020-07-17 PROCEDURE — 97161 PT EVAL LOW COMPLEX 20 MIN: CPT

## 2020-07-17 PROCEDURE — 88304 TISSUE EXAM BY PATHOLOGIST: CPT

## 2020-07-17 PROCEDURE — 85027 COMPLETE CBC AUTOMATED: CPT

## 2020-07-17 PROCEDURE — C1889: CPT

## 2020-07-17 PROCEDURE — 86901 BLOOD TYPING SEROLOGIC RH(D): CPT

## 2020-07-17 PROCEDURE — 85610 PROTHROMBIN TIME: CPT

## 2020-07-17 PROCEDURE — 97116 GAIT TRAINING THERAPY: CPT

## 2020-07-17 PROCEDURE — 93005 ELECTROCARDIOGRAM TRACING: CPT

## 2020-07-17 PROCEDURE — 76000 FLUOROSCOPY <1 HR PHYS/QHP: CPT

## 2020-07-17 PROCEDURE — 80048 BASIC METABOLIC PNL TOTAL CA: CPT

## 2020-07-17 PROCEDURE — C1713: CPT

## 2020-07-17 PROCEDURE — 86850 RBC ANTIBODY SCREEN: CPT

## 2020-07-17 PROCEDURE — 85025 COMPLETE CBC W/AUTO DIFF WBC: CPT

## 2020-07-17 PROCEDURE — 71046 X-RAY EXAM CHEST 2 VIEWS: CPT

## 2020-07-17 PROCEDURE — 87635 SARS-COV-2 COVID-19 AMP PRB: CPT

## 2020-07-17 PROCEDURE — 83036 HEMOGLOBIN GLYCOSYLATED A1C: CPT

## 2020-07-17 PROCEDURE — 80053 COMPREHEN METABOLIC PANEL: CPT

## 2020-07-22 PROBLEM — Z00.00 ENCOUNTER FOR PREVENTIVE HEALTH EXAMINATION: Status: ACTIVE | Noted: 2020-07-22

## 2020-07-23 DIAGNOSIS — Z87.891 PERSONAL HISTORY OF NICOTINE DEPENDENCE: ICD-10-CM

## 2020-07-23 DIAGNOSIS — M48.061 SPINAL STENOSIS, LUMBAR REGION WITHOUT NEUROGENIC CLAUDICATION: ICD-10-CM

## 2020-07-23 DIAGNOSIS — N17.9 ACUTE KIDNEY FAILURE, UNSPECIFIED: ICD-10-CM

## 2020-07-23 DIAGNOSIS — E11.22 TYPE 2 DIABETES MELLITUS WITH DIABETIC CHRONIC KIDNEY DISEASE: ICD-10-CM

## 2020-07-23 DIAGNOSIS — E66.9 OBESITY, UNSPECIFIED: ICD-10-CM

## 2020-07-23 DIAGNOSIS — N18.3 CHRONIC KIDNEY DISEASE, STAGE 3 (MODERATE): ICD-10-CM

## 2020-07-23 DIAGNOSIS — E78.5 HYPERLIPIDEMIA, UNSPECIFIED: ICD-10-CM

## 2020-07-23 DIAGNOSIS — M21.372 FOOT DROP, LEFT FOOT: ICD-10-CM

## 2020-07-23 DIAGNOSIS — M47.26 OTHER SPONDYLOSIS WITH RADICULOPATHY, LUMBAR REGION: ICD-10-CM

## 2020-07-23 DIAGNOSIS — M51.26 OTHER INTERVERTEBRAL DISC DISPLACEMENT, LUMBAR REGION: ICD-10-CM

## 2020-07-23 DIAGNOSIS — H33.21 SEROUS RETINAL DETACHMENT, RIGHT EYE: ICD-10-CM

## 2020-07-23 DIAGNOSIS — E11.65 TYPE 2 DIABETES MELLITUS WITH HYPERGLYCEMIA: ICD-10-CM

## 2020-07-23 DIAGNOSIS — M43.16 SPONDYLOLISTHESIS, LUMBAR REGION: ICD-10-CM

## 2020-07-23 DIAGNOSIS — I12.9 HYPERTENSIVE CHRONIC KIDNEY DISEASE WITH STAGE 1 THROUGH STAGE 4 CHRONIC KIDNEY DISEASE, OR UNSPECIFIED CHRONIC KIDNEY DISEASE: ICD-10-CM

## 2020-07-23 DIAGNOSIS — M47.16 OTHER SPONDYLOSIS WITH MYELOPATHY, LUMBAR REGION: ICD-10-CM

## 2020-07-24 ENCOUNTER — APPOINTMENT (OUTPATIENT)
Dept: ENDOCRINOLOGY | Facility: CLINIC | Age: 65
End: 2020-07-24
Payer: COMMERCIAL

## 2020-07-24 ENCOUNTER — RESULT CHARGE (OUTPATIENT)
Age: 65
End: 2020-07-24

## 2020-07-24 VITALS
WEIGHT: 212 LBS | BODY MASS INDEX: 32.13 KG/M2 | HEART RATE: 94 BPM | HEIGHT: 68 IN | DIASTOLIC BLOOD PRESSURE: 85 MMHG | SYSTOLIC BLOOD PRESSURE: 128 MMHG

## 2020-07-24 LAB — GLUCOSE BLDC GLUCOMTR-MCNC: 189

## 2020-07-24 PROCEDURE — 99205 OFFICE O/P NEW HI 60 MIN: CPT | Mod: 25,GC

## 2020-07-24 PROCEDURE — 97802 MEDICAL NUTRITION INDIV IN: CPT

## 2020-07-24 PROCEDURE — 82962 GLUCOSE BLOOD TEST: CPT | Mod: NC

## 2020-07-24 RX ORDER — METFORMIN HYDROCHLORIDE 500 MG/1
500 TABLET, COATED ORAL
Qty: 180 | Refills: 3 | Status: ACTIVE | COMMUNITY
Start: 2020-07-24 | End: 1900-01-01

## 2020-07-27 LAB
C PEPTIDE SERPL-MCNC: 2.7 NG/ML
CHOLEST SERPL-MCNC: 132 MG/DL
CHOLEST/HDLC SERPL: 3.8 RATIO
CREAT SPEC-SCNC: 168 MG/DL
HDLC SERPL-MCNC: 35 MG/DL
LDLC SERPL CALC-MCNC: 77 MG/DL
MICROALBUMIN 24H UR DL<=1MG/L-MCNC: 4.4 MG/DL
MICROALBUMIN/CREAT 24H UR-RTO: 26 MG/G
TRIGL SERPL-MCNC: 99 MG/DL

## 2020-08-03 RX ORDER — FLASH GLUCOSE SENSOR
KIT MISCELLANEOUS
Qty: 6 | Refills: 4 | Status: ACTIVE | COMMUNITY
Start: 2020-07-24 | End: 1900-01-01

## 2020-08-04 RX ORDER — LANCETS 28 GAUGE
EACH MISCELLANEOUS
Qty: 1 | Refills: 2 | Status: ACTIVE | COMMUNITY
Start: 2020-08-04 | End: 1900-01-01

## 2020-08-04 RX ORDER — BLOOD SUGAR DIAGNOSTIC
STRIP MISCELLANEOUS
Qty: 1 | Refills: 2 | Status: ACTIVE | COMMUNITY
Start: 2020-08-04 | End: 1900-01-01

## 2020-08-04 RX ORDER — BLOOD-GLUCOSE METER
KIT MISCELLANEOUS
Qty: 1 | Refills: 0 | Status: ACTIVE | COMMUNITY
Start: 2020-08-04 | End: 1900-01-01

## 2020-09-09 PROBLEM — I10 ESSENTIAL (PRIMARY) HYPERTENSION: Chronic | Status: ACTIVE | Noted: 2020-07-12

## 2020-09-09 PROBLEM — E11.9 TYPE 2 DIABETES MELLITUS WITHOUT COMPLICATIONS: Chronic | Status: ACTIVE | Noted: 2020-07-12

## 2020-09-11 ENCOUNTER — RESULT CHARGE (OUTPATIENT)
Age: 65
End: 2020-09-11

## 2020-09-11 ENCOUNTER — APPOINTMENT (OUTPATIENT)
Dept: ENDOCRINOLOGY | Facility: CLINIC | Age: 65
End: 2020-09-11
Payer: COMMERCIAL

## 2020-09-11 VITALS
WEIGHT: 210 LBS | BODY MASS INDEX: 31.93 KG/M2 | SYSTOLIC BLOOD PRESSURE: 140 MMHG | HEART RATE: 75 BPM | DIASTOLIC BLOOD PRESSURE: 90 MMHG

## 2020-09-11 DIAGNOSIS — E11.69 TYPE 2 DIABETES MELLITUS WITH OTHER SPECIFIED COMPLICATION: ICD-10-CM

## 2020-09-11 DIAGNOSIS — E66.9 TYPE 2 DIABETES MELLITUS WITH OTHER SPECIFIED COMPLICATION: ICD-10-CM

## 2020-09-11 LAB
GLUCOSE BLDC GLUCOMTR-MCNC: 154
HBA1C MFR BLD HPLC: 7.5

## 2020-09-11 PROCEDURE — 99214 OFFICE O/P EST MOD 30 MIN: CPT | Mod: 25,GC

## 2020-09-11 PROCEDURE — 82962 GLUCOSE BLOOD TEST: CPT | Mod: NC

## 2020-09-11 PROCEDURE — 83036 HEMOGLOBIN GLYCOSYLATED A1C: CPT | Mod: QW

## 2020-09-11 RX ORDER — ATORVASTATIN CALCIUM 20 MG/1
20 TABLET, FILM COATED ORAL
Qty: 1 | Refills: 2 | Status: ACTIVE | COMMUNITY
Start: 2020-09-11 | End: 1900-01-01

## 2020-10-28 ENCOUNTER — APPOINTMENT (OUTPATIENT)
Dept: ENDOCRINOLOGY | Facility: CLINIC | Age: 65
End: 2020-10-28

## 2020-11-11 ENCOUNTER — TRANSCRIPTION ENCOUNTER (OUTPATIENT)
Age: 65
End: 2020-11-11

## 2020-12-23 RX ORDER — SEMAGLUTIDE 1.34 MG/ML
2 INJECTION, SOLUTION SUBCUTANEOUS
Qty: 1 | Refills: 1 | Status: ACTIVE | COMMUNITY
Start: 2020-07-24 | End: 1900-01-01

## 2022-02-02 NOTE — ED ADULT NURSE NOTE - FINAL NURSING ELECTRONIC SIGNATURE
Operative Report - Urology  Patient: Pool Talavera 78 year old male  MRN: [id number]      Preoperative Diagnosis:  Bilateral nephrolithiasis  Postoperative Diagnosis:  Same    Procedure(s):    Cystoscopy, right ureteroscopy, laser lithotripsy and bilateral ureteral stent exchange    Surgeon: Jones Lamb MD   Assistant:  None  Assistant Tasks: None  Anesthesia: General  Anesthesiologist: La Hyde MD    Indication: 78 year old male with bilateral nephrolithiasis presented today for staged by ureteroscopy and laser lithotripsy..  Informed consent was obtained.    Findings:  Large right-sided calyceal stones.  Stones were located and calices with narrow infundibulum.  Wide caliber bulbar urethral stricture.  Encrusted bilateral ureteral stents.    Description of Procedure:    The patient was transported to the operative suite and anesthesia was administered without incident.  Perioperative antibiotics were administered and a surgical timeout was performed.    Patient was positioned dorsal lithotomy. He was prepped and draped in a sterile standard fashion. A 22fr rigid cystoscope was inserted atraumatically per urethra.  A bulbar urethral stricture was traversed easily.  Bilateral ureteral stents were noted.  The right ureteral stent was encrusted.  Attempted to extract the stent however the proximal end could not be pulled past the mid ureter.  A flexible ureteroscope was then passed over a wire till the mid ureter.  Calcifications around the stent were then fragmented with a 200micron laser fiber.  The stent was then extracted.  We then replaced the wire upto the right renal pelvis under fluoroscopic guidance.  A second wire then placed with a dual lumen catheter.  We then advanced a 12/14 ureteral access sheath upto the proximal ureter under fluoroscopic guidance over a wire.  We then advanced a ureteroscope upto the right renal pelvis.  2 large stones entrapped in the upper pole and lower pole calyx were  fragmented.  There was a poorly accessible large lower pole stone.  Another stone in the upper pole could not be located.  After ensuring now large fragments the ureteroscope was removed along with the access sheath.  No large stones were noted in the ureter.  We then reinserted the cystoscope and placed a 7x28 double j ureteral stent in the standard fashion.  The left ureteral stent was then extracted.  A wire was placed along the stent upto the left renal pelvis.  The stent was then completely extracted.  A 7x28 double j ureteral stent was then passed on the left side.  A 20fr murray catheter was placed to gravity drainage    At the conclusion of the procedure all sponge/needle/instrument counts were correct.  The patient was transported to the postoperative recovery area in the condition noted below.      Specimens: none    Drains: 20fr murray catheter    Implants:   Implant Name Type Inv. Item Serial No.  Lot No. LRB No. Used Action   TRIA URETERAL STENT 7 FR 28CM SOFT - [id number] Stent TRIA URETERAL STENT 7 FR 28CM SOFT  [employer] [id number] Left 1 Implanted   TRIA URETERAL STENT 7 FR 28CM SOFT - [id number] Stent TRIA URETERAL STENT 7 FR 28CM SOFT  [employer] [id number] Right 1 Implanted       Estimated blood loss: 20cc    Complications: none    Disposition, Condition, and Follow-up: followup 2 weeks with CT renal stone protocol    Jones Lamb MD  [facility]     12-Jul-2020 17:36

## 2024-06-06 NOTE — PATIENT PROFILE ADULT - DISASTER - FUNCTIONAL SCREEN CURRENT LEVEL: SWALLOWING (IF SCORE 2 OR MORE FOR ANY ITEM, CONSULT REHAB SERVICES), MLM)
Lab Results   Component Value Date    EGFR 37 05/09/2024    EGFR 35 09/01/2023    EGFR 36 07/26/2023    CREATININE 1.22 05/09/2024    CREATININE 1.29 09/01/2023    CREATININE 1.25 07/26/2023   creatinine and GFR stable   Will need periodic BMP  Avoid NSAIDs like ibuprofen Aleve Advil etc.  Avoid high potassium diet.  We will continue to monitor      0 = swallows foods/liquids without difficulty

## 2024-09-20 NOTE — PHYSICAL THERAPY INITIAL EVALUATION ADULT - TRANSFER SKILLS, REHAB EVAL
independent [Individual reports tobacco use during the last 30 days?] : Individual reports tobacco use during the last 30 days? No [Individual reports use of the following tobacco products during the last 30 days?] : Individual reports use of the following tobacco products during the last 30 days? No [Individual reports current use of tobacco cessation medication or nicotine replacement therapy?] : Individual reports current use of tobacco cessation medication or nicotine replacement therapy? No [Was tobacco cessation medication and/or nicotine replacement therapy recommended?] : Was tobacco cessation medication and/or nicotine replacement therapy recommended? No [Does individual accept referral to MD for cessation medication or NRT?] : Does individual accept referral to MD for cessation medication or NRT? No

## 2024-11-19 NOTE — H&P ADULT - ASSESSMENT
65M with lumbar radiculopathy
Condition:: Spot concern
Please Describe Your Condition:: Pt reports concern regarding rash located on left thigh that appeared 5 days ago. Pt states itch denies pain.  Pt has been using mupirocin w minimal improvement. Denies Hx of eczema, seasonal allergies or asthma.

## 2024-12-19 NOTE — DISCHARGE NOTE PROVIDER - DISCHARGE DATE
NOTIFICATION OF ADMISSION DISCHARGE   This is a Notification of Discharge from [facility]. Please be advised that this patient has been discharge from our facility. Below you will find the admission and discharge date and time including the patient’s disposition.   UTILIZATION REVIEW CONTACT:  Kelsey Caal  Utilization   Network Utilization Review Department  Phone: [phone] x carefully listen to the prompts. All voicemails are confidential.  Email: [email or web address]     ADMISSION INFORMATION  PRESENTATION DATE: 12/15/2024  2:05 PM  OBERVATION ADMISSION DATE: 12/15/2024 1653  INPATIENT ADMISSION DATE: 12/16/24  2:52 PM   DISCHARGE DATE: 12/18/2024  1:29 PM   DISPOSITION:Home/Self Care    Network Utilization Review Department  ATTENTION: Please call with any questions or concerns to [phone] and carefully listen to the prompts so that you are directed to the right person. All voicemails are confidential.   For Discharge needs, contact Care Management DC Support Team at [phone] opt. 2  Send all requests for admission clinical reviews, approved or denied determinations and any other requests to dedicated fax number below belonging to the campus where the patient is receiving treatment. List of dedicated fax numbers for the Facilities:  FACILITY NAME UR FAX NUMBER   ADMISSION DENIALS (Administrative/Medical Necessity) [phone]   DISCHARGE SUPPORT TEAM ([facility]) [phone]   PARENT CHILD HEALTH (Maternity/NICU/Pediatrics) [phone]   [facility] [phone]   [facility] [phone]   [facility] [phone]   [facility] [phone]   [facility] [phone]   [facility] [phone]   [facility] [phone]   [facility]  [phone]   [facility] [phone]   [facility] [phone]   [facility] [phone]   [facility] [phone]          16-Jul-2020